# Patient Record
Sex: MALE | Race: WHITE
[De-identification: names, ages, dates, MRNs, and addresses within clinical notes are randomized per-mention and may not be internally consistent; named-entity substitution may affect disease eponyms.]

---

## 2018-04-17 ENCOUNTER — HOSPITAL ENCOUNTER (INPATIENT)
Dept: HOSPITAL 97 - ER | Age: 58
LOS: 6 days | Discharge: HOME | DRG: 392 | End: 2018-04-23
Attending: FAMILY MEDICINE | Admitting: FAMILY MEDICINE
Payer: SELF-PAY

## 2018-04-17 VITALS — BODY MASS INDEX: 25.1 KG/M2

## 2018-04-17 DIAGNOSIS — I10: ICD-10-CM

## 2018-04-17 DIAGNOSIS — E88.09: ICD-10-CM

## 2018-04-17 DIAGNOSIS — F10.20: ICD-10-CM

## 2018-04-17 DIAGNOSIS — E83.42: ICD-10-CM

## 2018-04-17 DIAGNOSIS — K52.9: Primary | ICD-10-CM

## 2018-04-17 DIAGNOSIS — R17: ICD-10-CM

## 2018-04-17 DIAGNOSIS — R74.0: ICD-10-CM

## 2018-04-17 DIAGNOSIS — E87.5: ICD-10-CM

## 2018-04-17 DIAGNOSIS — E87.1: ICD-10-CM

## 2018-04-17 DIAGNOSIS — Z72.0: ICD-10-CM

## 2018-04-17 LAB
ALBUMIN SERPL BCP-MCNC: 2.9 G/DL (ref 3.2–5.5)
ALP SERPL-CCNC: 349 IU/L (ref 42–121)
ALT SERPL W P-5'-P-CCNC: 115 IU/L (ref 10–60)
AST SERPL W P-5'-P-CCNC: 346 IU/L (ref 10–42)
BUN BLD-MCNC: 12 MG/DL (ref 6–20)
GLUCOSE SERPLBLD-MCNC: 98 MG/DL (ref 65–120)
HCT VFR BLD CALC: 43.6 % (ref 39.6–49)
INR BLD: 0.92
LIPASE SERPL-CCNC: 49 U/L (ref 22–51)
LYMPHOCYTES # SPEC AUTO: 1.8 K/UL (ref 0.7–4.9)
MCH RBC QN AUTO: 28.7 PG (ref 27–35)
MCV RBC: 88.1 FL (ref 80–100)
PMV BLD: 11.3 FL (ref 7.6–11.3)
POTASSIUM SERPL-SCNC: 4.5 MEQ/L (ref 3.6–5)
RBC # BLD: 4.95 M/UL (ref 4.33–5.43)

## 2018-04-17 PROCEDURE — 80048 BASIC METABOLIC PNL TOTAL CA: CPT

## 2018-04-17 PROCEDURE — 80076 HEPATIC FUNCTION PANEL: CPT

## 2018-04-17 PROCEDURE — 82274 ASSAY TEST FOR BLOOD FECAL: CPT

## 2018-04-17 PROCEDURE — 87046 STOOL CULTR AEROBIC BACT EA: CPT

## 2018-04-17 PROCEDURE — 80074 ACUTE HEPATITIS PANEL: CPT

## 2018-04-17 PROCEDURE — 83735 ASSAY OF MAGNESIUM: CPT

## 2018-04-17 PROCEDURE — 87177 OVA AND PARASITES SMEARS: CPT

## 2018-04-17 PROCEDURE — 84100 ASSAY OF PHOSPHORUS: CPT

## 2018-04-17 PROCEDURE — 93005 ELECTROCARDIOGRAM TRACING: CPT

## 2018-04-17 PROCEDURE — 80307 DRUG TEST PRSMV CHEM ANLYZR: CPT

## 2018-04-17 PROCEDURE — 96365 THER/PROPH/DIAG IV INF INIT: CPT

## 2018-04-17 PROCEDURE — 82705 FATS/LIPIDS FECES QUAL: CPT

## 2018-04-17 PROCEDURE — 83690 ASSAY OF LIPASE: CPT

## 2018-04-17 PROCEDURE — 84484 ASSAY OF TROPONIN QUANT: CPT

## 2018-04-17 PROCEDURE — 85025 COMPLETE CBC W/AUTO DIFF WBC: CPT

## 2018-04-17 PROCEDURE — 36415 COLL VENOUS BLD VENIPUNCTURE: CPT

## 2018-04-17 PROCEDURE — 85610 PROTHROMBIN TIME: CPT

## 2018-04-17 PROCEDURE — 96361 HYDRATE IV INFUSION ADD-ON: CPT

## 2018-04-17 PROCEDURE — 96368 THER/DIAG CONCURRENT INF: CPT

## 2018-04-17 PROCEDURE — 82962 GLUCOSE BLOOD TEST: CPT

## 2018-04-17 PROCEDURE — 74177 CT ABD & PELVIS W/CONTRAST: CPT

## 2018-04-17 PROCEDURE — 99285 EMERGENCY DEPT VISIT HI MDM: CPT

## 2018-04-17 PROCEDURE — 80329 ANALGESICS NON-OPIOID 1 OR 2: CPT

## 2018-04-17 PROCEDURE — 81003 URINALYSIS AUTO W/O SCOPE: CPT

## 2018-04-17 PROCEDURE — 83036 HEMOGLOBIN GLYCOSYLATED A1C: CPT

## 2018-04-17 PROCEDURE — 87493 C DIFF AMPLIFIED PROBE: CPT

## 2018-04-17 PROCEDURE — 80053 COMPREHEN METABOLIC PANEL: CPT

## 2018-04-17 PROCEDURE — 81015 MICROSCOPIC EXAM OF URINE: CPT

## 2018-04-17 PROCEDURE — 85730 THROMBOPLASTIN TIME PARTIAL: CPT

## 2018-04-17 PROCEDURE — 87209 SMEAR COMPLEX STAIN: CPT

## 2018-04-17 PROCEDURE — 96375 TX/PRO/DX INJ NEW DRUG ADDON: CPT

## 2018-04-17 PROCEDURE — 87045 FECES CULTURE AEROBIC BACT: CPT

## 2018-04-17 RX ADMIN — Medication SCH ML: at 21:14

## 2018-04-17 RX ADMIN — CIPROFLOXACIN SCH: 2 INJECTION, SOLUTION INTRAVENOUS at 21:00

## 2018-04-17 RX ADMIN — KETOROLAC TROMETHAMINE PRN MG: 30 INJECTION, SOLUTION INTRAMUSCULAR at 21:13

## 2018-04-17 RX ADMIN — METRONIDAZOLE SCH: 500 INJECTION, SOLUTION INTRAVENOUS at 18:00

## 2018-04-17 RX ADMIN — SODIUM CHLORIDE SCH MLS: 0.9 INJECTION, SOLUTION INTRAVENOUS at 21:13

## 2018-04-17 NOTE — ER
Nurse's Notes                                                                                     

 [facility]                                                                

Name: Nitin Ricci                                                                             

Age: 57 yrs                                                                                       

Sex: Male                                                                                         

: 1960                                                                                   

MRN: [id number]                                                                                   

Arrival Date: 2018                                                                          

Time: 09:48                                                                                       

Account#: [id number]                                                                            

Bed 20                                                                                            

Private MD:                                                                                       

Diagnosis: Colitis;Vomiting                                                                       

                                                                                                  

Presentation:                                                                                     

                                                                                             

10:24 Presenting complaint: Patient states: " My stomach has been hurting me bad for about a  ph  

      week and a half." Reports pain in L upper and lower quandrants, + N/V/D, denies fever.      

      Transition of care: patient was not received from another setting of care. Onset of         

      symptoms was 2018. Initial Sepsis Screen: Does the patient meet any 2             

      criteria? No. Patient's initial sepsis screen is negative. Does the patient have a          

      suspected source of infection? No. Patient initial sepsis screen negative. Care prior       

      to arrival: None.                                                                           

10:24 Method Of Arrival: Ambulatory                                                           ph  

10:24 Acuity: PREETI 3                                                                           ph  

                                                                                                  

Historical:                                                                                       

- Allergies:                                                                                      

10:26 No Known Allergies;                                                                     ph  

- Home Meds:                                                                                      

10:26 hydrochlorothiazide 25 mg Oral tab 1 tab once daily [Active];                           ph  

- PMHx:                                                                                           

10:26 Hypertension;                                                                           ph  

- PSHx:                                                                                           

10:26 Cholecystectomy; Appendectomy;                                                          ph  

                                                                                                  

- Immunization history:: Adult Immunizations unknown.                                             

- Social history:: Smoking status: Patient uses tobacco products, smokes one-half pack            

  cigarettes per day.                                                                             

- Family history:: not pertinent.                                                                 

- Hospitalizations: : No recent hospitalization is reported.                                      

                                                                                                  

                                                                                                  

Screening:                                                                                        

10:30 Abuse screen: Denies threats or abuse. Denies injuries from another. Nutritional        aj1 

      screening: No deficits noted. Tuberculosis screening: No symptoms or risk factors           

      identified.                                                                                 

17:10 Fall Risk No fall in past 12 months (0 pts). No secondary diagnosis (0 pts). IV access  aj1 

      (20 points). Ambulatory Aid- None/Bed Rest/Nurse Assist (0 pts). Gait- Normal/Bed           

      Rest/Wheelchair (0 pts) Mental Status- Oriented to own ability (0 pts). Total Dougherty         

      Fall Scale indicates No Risk (0-24 pts).                                                    

                                                                                                  

Assessment:                                                                                       

10:30 General: Appears in no apparent distress. uncomfortable, Behavior is calm, cooperative, aj1 

      appropriate for age. Pain: Complains of pain in right upper quadrant and right lower        

      quadrant Pain does not radiate. Pain currently is 10 out of 10 on a pain scale. Quality     

      of pain is described as sharp, Pain began one week ago. Neuro: Level of Consciousness       

      is awake, alert, obeys commands, Oriented to person, place, time, situation, Speech is      

      normal, Facial symmetry appears normal. Cardiovascular: Patient's skin is warm and dry.     

      Respiratory: Airway is patent Respiratory effort is even, unlabored, Respiratory            

      pattern is regular, symmetrical. GI: Abdomen is flat, non-distended, Bowel sounds           

      present X 4 quads. Abd is soft X 4 quads Abdomen is tender to palpation X 4 quads.          

      Reports lower abdominal pain, upper abdominal pain, diarrhea, nausea, vomiting. : No      

      signs and/or symptoms were reported regarding the genitourinary system. EENT: No signs      

      and/or symptoms were reported regarding the EENT system. Derm: No signs and/or symptoms     

      reported regarding the dermatologic system. Skin is pink, warm \T\ dry. normal.             

      Musculoskeletal: No signs and/or symptoms reported regarding the musculoskeletal            

      system. Circulation, motion, and sensation intact.                                          

11:18 Reassessment: Patient appears in no apparent distress at this time. No changes from     aj1 

      previously documented assessment. Patient and/or family updated on plan of care and         

      expected duration. Pain level reassessed. Patient is alert, oriented x 3, equal             

      unlabored respirations, skin warm/dry/pink.                                                 

12:28 Reassessment: Patient appears in no apparent distress at this time. No changes from     aj1 

      previously documented assessment. Patient and/or family updated on plan of care and         

      expected duration. Pain level reassessed. Patient is alert, oriented x 3, equal             

      unlabored respirations, skin warm/dry/pink.                                                 

13:17 Reassessment: Patient appears in no apparent distress at this time. No changes from     aj1 

      previously documented assessment. Patient and/or family updated on plan of care and         

      expected duration. Pain level reassessed. Patient is alert, oriented x 3, equal             

      unlabored respirations, skin warm/dry/pink.                                                 

14:19 Reassessment: Patient and/or family updated on plan of care and expected duration. Pain aj1 

      level reassessed. General: Appears in no apparent distress. comfortable, Behavior is        

      calm, cooperative. Neuro: Level of Consciousness is awake, alert, obeys commands,           

      Oriented to person, place, time, situation, Speech is normal, Facial symmetry appears       

      normal. Cardiovascular: Patient's skin is warm and dry. Respiratory: Airway is patent       

      Respiratory effort is even, unlabored, Respiratory pattern is regular, symmetrical. GI:     

      Abdomen is flat, non-distended, Abd is soft X 4 quads. Derm: Skin is pink, warm \T\ dry.    

      normal. Musculoskeletal: Circulation, motion, and sensation intact.                         

15:22 Reassessment: Patient appears in no apparent distress at this time. No changes from     aj1 

      previously documented assessment. Patient and/or family updated on plan of care and         

      expected duration. Pain level reassessed. Patient is alert, oriented x 3, equal             

      unlabored respirations, skin warm/dry/pink.                                                 

16:30 Reassessment: Patient appears in no apparent distress at this time. No changes from     aj1 

      previously documented assessment. Patient and/or family updated on plan of care and         

      expected duration. Pain level reassessed. Patient is alert, oriented x 3, equal             

      unlabored respirations, skin warm/dry/pink.                                                 

17:09 Reassessment: Dr. Bob at bedside.                                                     aj1 

17:30 Reassessment: Patient appears in no apparent distress at this time. No changes from     aj1 

      previously documented assessment. Patient and/or family updated on plan of care and         

      expected duration. Pain level reassessed. Patient is alert, oriented x 3, equal             

      unlabored respirations, skin warm/dry/pink.                                                 

18:30 Reassessment: Patient and/or family updated on plan of care and expected duration. Pain aj1 

      level reassessed. General: Appears in no apparent distress. comfortable, Behavior is        

      calm, cooperative. Neuro: Level of Consciousness is awake, alert, obeys commands,           

      Oriented to person, place, time, situation, Speech is normal, Facial symmetry appears       

      normal. Cardiovascular: Patient's skin is warm and dry. Respiratory: Airway is patent       

      Respiratory effort is even, unlabored, Respiratory pattern is regular, symmetrical. GI:     

      Abdomen is flat, non-distended, Bowel sounds present X 4 quads. Abd is soft X 4 quads       

      Abdomen is tender to palpation X 4 quads. Reports lower abdominal pain, upper abdominal     

      pain. : No signs and/or symptoms were reported regarding the genitourinary system.        

      EENT: No signs and/or symptoms were reported regarding the EENT system. Derm: No signs      

      and/or symptoms reported regarding the dermatologic system. Skin is pink, warm \T\ dry.     

      normal. Musculoskeletal: No signs and/or symptoms reported regarding the                    

      musculoskeletal system. Circulation, motion, and sensation intact.                          

19:30 Reassessment: Patient appears in no apparent distress at this time. No changes from     aj1 

      previously documented assessment. Patient and/or family updated on plan of care and         

      expected duration. Pain level reassessed. Patient is alert, oriented x 3, equal             

      unlabored respirations, skin warm/dry/pink.                                                 

20:12 Reassessment: Patient appears in no apparent distress at this time. No changes from     aj1 

      previously documented assessment. Patient and/or family updated on plan of care and         

      expected duration. Pain level reassessed. Patient is alert, oriented x 3, equal             

      unlabored respirations, skin warm/dry/pink.                                                 

                                                                                                  

Vital Signs:                                                                                      

10:26  / 115; Pulse 122; Resp 24; Temp 97.3; Pulse Ox 96% on R/A; Weight 90.72 kg;      ph  

      Height 6 ft. 3 in. (190.50 cm); Pain 10/10;                                                 

11:18  / 87; Pulse 98; Resp 18; Pulse Ox 95% on R/A;                                    aj1 

13:17  / 87; Pulse 93; Resp 18; Pulse Ox 97% ;                                          aj1 

14:20  / 88; Pulse 93; Resp 18; Pulse Ox 98% ;                                          aj1 

15:22  / 90; Pulse 102; Resp 18; Pulse Ox 95% on R/A;                                   aj1 

17:10  / 87; Pulse 105; Resp 20; Pulse Ox 98% on R/A;                                   aj1 

20:12  / 89; Pulse 97; Resp 18; Pulse Ox 96% on R/A;                                    aj1 

10:26 Body Mass Index 25.00 (90.72 kg, 190.50 cm)                                             ph  

                                                                                                  

ED Course:                                                                                        

09:48 Patient arrived in ED.                                                                  as  

10:26 Triage completed.                                                                       ph  

10:27 Arm band placed on.                                                                     ph  

10:29 Rudolph Mazariegos MD is Attending Physician.                                                rn  

10:29 Kalpana Harrison, RN is Primary Nurse.                                                   aj1 

10:30 Patient has correct armband on for positive identification. Bed in low position. Call   aj1 

      light in reach. Side rails up X 1.                                                          

10:30 No provider procedures requiring assistance completed.                                  aj1 

10:41 Inserted saline lock: 22 gauge in right forearm, using aseptic technique. ,using        aj1 

      aseptic technique. IV inserted by ED Tech Joshua. Blood collected.                           

11:23 EKG done, by EKG tech. reviewed by Rudolph Mazariegos MD.                                        

13:57 Radiology exam delayed due to lab results not completed at this time. (BUN/Creatinine).   

14:42 CT Abd/Pelvis - W/Contrast In Process Unspecified.                                      EDMS

14:43 CT completed. Patient tolerated procedure well. Patient moved back from CT.             sj  

15:29 Ranulfo Bob DO is Hospitalizing Provider.                                            rn  

15:29 Urmila Bob MD is Hospitalizing Provider.                                           rn  

20:11 Report given to DANIEL Tucker.                                                          aj1 

20:11 Patient admitted, IV remains in place.                                                  aj1 

                                                                                                  

Administered Medications:                                                                         

11:03 Drug: morphine 4 mg Route: IVP; Site: right forearm;                                    aj1 

11:50 Follow up: Response: No adverse reaction                                                aj1 

11:04 Drug: Zofran 4 mg Route: IVP; Site: right forearm;                                      aj1 

11:51 Follow up: Response: No adverse reaction                                                aj1 

11:04 Drug: NS 0.9% 1000 ml Route: IV; Rate: 1000 ml; Site: right forearm;                    aj1 

17:12 Follow up: IV Status: Completed infusion; IV Intake: 1000ml                             aj1 

15:51 Drug: Cipro 400 mg Volume: 200 ml; Route: IVPB; Infused Over: 60 mins; Site: right      aj1 

      forearm;                                                                                    

17:12 Follow up: IV Status: Completed infusion; IV Intake: 200ml                              aj1 

20:13 Follow up: IV Status: Completed infusion; IV Intake: 200ml                              aj1 

15:51 Drug: Flagyl 500 mg Volume: 100 ml; Route: IVPB; Rate: 200 ml/hr; Infused Over: 30      aj1 

      mins; Site: right forearm;                                                                  

17:13 Follow up: IV Status: Completed infusion; IV Intake: 100ml                              aj1 

20:13 Follow up: IV Status: Completed infusion; IV Intake: 100ml                              aj1 

                                                                                                  

                                                                                                  

Intake:                                                                                           

17:12 IV: 1000ml; Total: 1000ml.                                                              aj1 

17:12 IV: 200ml; Total: 1200ml.                                                               aj1 

17:13 IV: 100ml; Total: 1300ml.                                                               aj1 

20:13 IV: 200ml; Total: 1500ml.                                                               aj1 

20:13 IV: 100ml; Total: 1600ml.                                                               aj1 

                                                                                                  

Outcome:                                                                                          

15:31 Decision to Hospitalize by Provider.                                                    rn  

20:37 Admitted to Med/surg accompanied by tech, via wheelchair, room 406.                     aj1 

20:37 Condition: stable                                                                           

20:37 Discharge instructions given to patient, Instructed on the need for admit, Demonstrated     

      understanding of instructions.                                                              

20:37 Patient left the ED.                                                                    aj1 

                                                                                                  

Signatures:                                                                                       

Dispatcher [employer]                           EDMS                                                 

Kalpana Harrison RN                     RN   aj1                                                  

Anel Echeverria Amelia as Nieto, Roman, MD MD rn Harrell, Venessa vh Hall, Patricia, RN                      RN   ph                                                   

                                                                                                  

Corrections: (The following items were deleted from the chart)                                    

:10 11: General: Appears in no apparent distress. uncomfortable, Behavior is calm,        aj1 

      cooperative, appropriate for age, aj1                                                       

11:10 11: Pain: Complains of pain in right upper quadrant and right lower quadrant Pain     aj1 

      does not radiate. Pain currently is 10 out of 10 on a pain scale. Quality of pain is        

      described as sharp, Pain began one week ago aj1                                             

11:10 11: Neuro: Level of Consciousness is awake, alert, obeys commands, Oriented to        aj1 

      person, place, time, situation, Speech is normal, Facial symmetry appears normal, aj1       

11:10 11: Cardiovascular: Patient's skin is warm and dry. aj1                               aj1 

11:10 11: Respiratory: Airway is patent Respiratory effort is even, unlabored, Respiratory  aj1 

      pattern is regular, symmetrical, aj1                                                        

11:10 11: GI: Abdomen is flat, non-distended, Bowel sounds present X 4 quads. Abd is soft X aj1 

      4 quads Abdomen is tender to palpation X 4 quads. Reports lower abdominal pain, upper       

      abdominal pain, diarrhea, nausea, vomiting, aj1                                             

11:10 11: : No signs and/or symptoms were reported regarding the genitourinary system. aj1aj1 

:10 11:06 EENT: No signs and/or symptoms were reported regarding the EENT system. aj1       aj1 

11:10 11: Derm: No signs and/or symptoms reported regarding the dermatologic system. Skin   aj1 

      is pink, warm \T\ dry. normal, aj1                                                          

11:10 11: Musculoskeletal: No signs and/or symptoms reported regarding the musculoskeletal  aj1 

      system. Circulation, motion, and sensation intact. aj1                                      

                                                                                                  

**************************************************************************************************

## 2018-04-17 NOTE — EKG
Test Date:    2018-04-17               Test Time:    11:12:10

Technician:   NURIS                                    

                                                     

MEASUREMENT RESULTS:                                       

Intervals:                                           

Rate:         100                                    

VA:           140                                    

QRSD:         88                                     

QT:           334                                    

QTc:          430                                    

Axis:                                                

P:            59                                     

VA:           140                                    

QRS:          75                                     

T:            61                                     

                                                     

INTERPRETIVE STATEMENTS:                                       

                                                     

Normal sinus rhythm

Normal ECG

Compared to ECG 01/19/2018 12:24:34

Sinus tachycardia no longer present



Electronically Signed On 04-17-18 16:27:49 CDT by Too Huber

## 2018-04-17 NOTE — P.HP
Certification for Inpatient


Patient admitted to: Inpatient


With expected LOS: >2 Midnights


Patient will require the following post-hospital care: None


Practitioner: I am a practitioner with admitting privileges, knowledge of 

patient current condition, hospital course, and medical plan of care.


Services: Services provided to patient in accordance with Admission 

requirements found in Title 42 Section 412.3 of the Code of Federal Regulations





Patient History


Date of Service: 04/17/18


Primary Care Provider: None


Reason for admission: Abd pain


History of Present Illness: 





This is a 57-year-old male with significant past medical history of 

hypertension who presented to the ED complaining of having some diffuse 

abdominal pain nausea vomiting and watery diarrhea.  Patient stated that his 

symptoms have been going on for about 1 week and a half and has been getting 

progressively worse.  He has not been able to keep anything down other than 

soup at night time which he eventually throws up in the morning.  Patient 

states that in the morning he starts having some dry heaving and then noticed 

that he cough off all blood.  Patient says that he has not had similar episodes 

in the past and is the 1st time that he noticed the abdominal pain.  Patient is 

not followup with a primary care provider or does not have any GI doctor.  

Patient is states that he takes about 1-2 alcoholic beverages at night time 

mostly beer.


Allergies





No Known Drug Allergies Allergy (Mild, Unverified 06/22/16 03:34)


 Unknown


No Known Allergie Allergy (Uncoded 06/18/16 22:54)


 Unknown





Home Medications: 








NK [No Home Meds]  04/17/15 








- Past Medical/Surgical History


Diabetic: No


-: HTN


-: ETOH ABUSE


-: paralytic ileus


-: cholecystectomy


-: appendectomy





- Social History


Alcohol use: Yes


CD- Drugs: No


Caffeine use: Yes





Review of Systems


General: As per HPI





Physical Examination





- Physical Exam


General: Alert, In no apparent distress, Oriented x3


HEENT: Atraumatic, Scleral icterus


Neck: Supple


Respiratory: Clear to auscultation bilaterally, Normal air movement


Cardiovascular: Regular rate/rhythm, Normal S1 S2


Gastrointestinal: Normal bowel sounds, Tenderness (Generalized lower abdomen)


Musculoskeletal: No tenderness


Integumentary: No rashes


Neurological: Normal speech, Normal tone


Lymphatics: No axilla or inguinal lymphadenopathy





- Studies


Laboratory Data (last 24 hrs)





04/17/18 12:40: Creatinine 0.72


04/17/18 12:40: Sodium 132 L, Potassium 4.5, BUN 12, Creatinine 0.72, Glucose 98

, Total Bilirubin 4.2 H,  H*,  H, Alkaline Phosphatase 349 H, 

Lipase 49


04/17/18 10:31: WBC 6.0, Hgb 14.2, Hct 43.6, Plt Count 165








Assessment and Plan





- Problems (Diagnosis)


(1) Colitis


Current Visit: Yes   Status: Acute   


Plan: 


Abd pain with N/V 


-Cipro and flagyl 


-GI consulted. 


-NPO with IV fluids 











(2) Elevated transaminase level


Current Visit: Yes   Status: Acute   


Plan: 


H/O of questionable Hepatitis 


-Acute panel ordered 


-IV fluids and GI consult. 











(3) HTN (hypertension)


Current Visit: Yes   Status: Chronic   


Qualifiers: 


   Hypertension type: essential hypertension   Qualified Code(s): I10 - 

Essential (primary) hypertension   


Discharge Plan: Home


Plan to discharge in: 24 Hours





- Advance Directives


Does patient have a Living Will: No


Does patient have a Durable POA for Healthcare: No





- Code Status/Comfort Care


Code Status Assessed: Yes


Critical Care: No

## 2018-04-17 NOTE — EDPHYS
Physician Documentation                                                                           

 [facility]                                                                

Name: Nitin Ricci                                                                             

Age: 57 yrs                                                                                       

Sex: Male                                                                                         

: 1960                                                                                   

MRN: [id number]                                                                                   

Arrival Date: 2018                                                                          

Time: 09:48                                                                                       

Account#: [id number]                                                                            

Bed 20                                                                                            

Private MD:                                                                                       

ED Physician Rudolph Mazariegos                                                                         

HPI:                                                                                              

                                                                                             

10:51 This 57 yrs old  Male presents to ER via Ambulatory with complaints of         rn  

      Abdominal Pain.                                                                             

10:51 The patient presents with abdominal pain that is diffuse. Onset: The symptoms/episode   rn  

      began/occurred 1 week(s) ago. The symptoms do not radiate. Associated signs and             

      symptoms: Pertinent positives: nausea and vomiting, diarrhea, Pertinent negatives:          

      fever, shortness of breath, testicular pain. Modifying factors: The symptoms are            

      alleviated by nothing, the symptoms are aggravated by movement, touching the area.          

      Severity of pain: At its worst the pain was moderate in the emergency department the        

      pain is unchanged. The patient has not experienced similar symptoms in the past.            

      Reports diffuse abd pain, nausea/vomiting/diarrhea, states small amount of blood in         

      emesis the most recent episodes, drinks frequently but not today. Reports                   

      cholecystectomy and appendectomy. .                                                         

                                                                                                  

Historical:                                                                                       

- Allergies:                                                                                      

10:26 No Known Allergies;                                                                     ph  

- Home Meds:                                                                                      

10:26 hydrochlorothiazide 25 mg Oral tab 1 tab once daily [Active];                           ph  

- PMHx:                                                                                           

10:26 Hypertension;                                                                           ph  

- PSHx:                                                                                           

10:26 Cholecystectomy; Appendectomy;                                                          ph  

                                                                                                  

- Immunization history:: Adult Immunizations unknown.                                             

- Social history:: Smoking status: Patient uses tobacco products, smokes one-half pack            

  cigarettes per day.                                                                             

- Family history:: not pertinent.                                                                 

- Hospitalizations: : No recent hospitalization is reported.                                      

                                                                                                  

                                                                                                  

ROS:                                                                                              

10:51 Constitutional: Negative for fever, chills, and weight loss, Eyes: Negative for injury, rn  

      pain, redness, and discharge, Neck: Negative for injury, pain, and swelling,                

      Cardiovascular: Negative for chest pain, palpitations, and edema, Respiratory: Negative     

      for shortness of breath, cough, wheezing, and pleuritic chest pain, Abdomen/GI: + abd       

      pain/nausea/vomiting/diarrhea Back: Negative for injury and pain, MS/Extremity:             

      Negative for injury and deformity, Skin: Negative for injury, rash, and discoloration,      

      Neuro: Negative for headache, weakness, numbness, tingling, and seizure.                    

                                                                                                  

Exam:                                                                                             

10:51 Constitutional:  This is a well developed, well nourished patient who is awake, alert,  rn  

      and in no acute distress. Head/Face:  Normocephalic, atraumatic. Eyes:  Pupils equal        

      round and reactive to light, extra-ocular motions intact.  Lids and lashes normal.          

      Conjunctiva and sclera are non-icteric and not injected.  Cornea within normal limits.      

      Periorbital areas with no swelling, redness, or edema. Neck:  Trachea midline, no           

      thyromegaly or masses palpated, and no cervical lymphadenopathy.  Supple, full range of     

      motion without nuchal rigidity, or vertebral point tenderness.  No Meningismus.             

      Cardiovascular:  tachycardic, regular, no murmur Respiratory:  + mild tachypnea, no         

      retractions, faint exp wheezing Abdomen/GI:  soft, + tender in all quadrants MS/            

      Extremity:  Pulses equal, no cyanosis.  Neurovascular intact.  Full, normal range of        

      motion.  Equal circumference. Neuro:  Awake and alert, GCS 15, oriented to person,          

      place, time, and situation.  Cranial nerves II-XII grossly intact.  Motor strength 5/5      

      in all extremities.  Sensory grossly intact.  Cerebellar exam normal.  Normal gait.         

11:32 ECG was reviewed by the Attending Physician.                                            rn  

                                                                                                  

Vital Signs:                                                                                      

10:26  / 115; Pulse 122; Resp 24; Temp 97.3; Pulse Ox 96% on R/A; Weight 90.72 kg;      ph  

      Height 6 ft. 3 in. (190.50 cm); Pain 10/10;                                                 

11:18  / 87; Pulse 98; Resp 18; Pulse Ox 95% on R/A;                                    aj1 

13:17  / 87; Pulse 93; Resp 18; Pulse Ox 97% ;                                          aj1 

14:20  / 88; Pulse 93; Resp 18; Pulse Ox 98% ;                                          aj1 

15:22  / 90; Pulse 102; Resp 18; Pulse Ox 95% on R/A;                                   aj1 

17:10  / 87; Pulse 105; Resp 20; Pulse Ox 98% on R/A;                                   aj1 

20:12  / 89; Pulse 97; Resp 18; Pulse Ox 96% on R/A;                                    aj1 

10:26 Body Mass Index 25.00 (90.72 kg, 190.50 cm)                                             ph  

                                                                                                  

MDM:                                                                                              

10:29 Patient medically screened.                                                             rn  

15:28 Differential diagnosis: bowel obstruction, diverticulitis, gastritis, gastroesophageal  rn  

      reflux disease, non-specific abd pain, Ureterolithiasis. Data reviewed: vital signs,        

      nurses notes, lab test result(s), radiologic studies, CT scan, and as a result, I will      

      admit patient. Counseling: I had a detailed discussion with the patient and/or guardian     

      regarding: the historical points, exam findings, and any diagnostic results supporting      

      the discharge/admit diagnosis, lab results, radiology results, the need for further         

      work-up and treatment in the hospital. Response to treatment: the patient's symptoms        

      have mildly improved after treatment, and as a result, I will admit patient. Admission      

      orders: after a detailed discussion of the patient's condition and case, the admit          

      orders are written by me. ED course: Pt still having vomiting episodes, tender, scared      

      to go home since lives by himself, given abx, will admit. .                                 

                                                                                                  

                                                                                             

10:35 Order name: Basic Metabolic Panel; Complete Time: 14:52                                 rn  

                                                                                             

10:35 Order name: CBC with Diff; Complete Time: 12:34                                         rn  

                                                                                             

10:35 Order name: Creatinine for Radiology; Complete Time: 14:52                              rn  

                                                                                             

10:35 Order name: Hepatic Function; Complete Time: 14:52                                      rn  

                                                                                             

10:35 Order name: Lipase; Complete Time: 14:52                                                rn  

                                                                                             

10:35 Order name: Troponin (emerg Dept Use Only); Complete Time: 14:52                        rn  

                                                                                             

10:35 Order name: CT Abd/Pelvis - W/Contrast; Complete Time: 15:04                            rn  

                                                                                             

18:30 Order name: Protime (+INR)                                                              EDMS

                                                                                             

18:30 Order name: PTT, Activated Partial Thromb                                               EDMS

                                                                                             

10:35 Order name: IV Saline Lock; Complete Time: 10:54                                        rn  

                                                                                             

10:35 Order name: Labs collected and sent; Complete Time: 10:54                               rn  

                                                                                             

10:35 Order name: EKG; Complete Time: 10:36                                                   rn  

                                                                                             

10:35 Order name: EKG - Nurse/Tech; Complete Time: 11:50                                      rn  

                                                                                             

11:13 Order name: Labs - recollect needed; Complete Time: 11:50                               bd  

                                                                                                  

EC:32 Rate is 100 beats/min. Rhythm is regular. QRS Axis is Normal. CT interval is normal.    rn  

      QRS interval is normal. QT interval is normal. No Q waves. T waves are Normal. No ST        

      changes noted. Clinical impression: Normal ECG. Interpreted by me.                          

                                                                                                  

Administered Medications:                                                                         

11:03 Drug: morphine 4 mg Route: IVP; Site: right forearm;                                    aj 

11:50 Follow up: Response: No adverse reaction                                                [facility] 

11:04 Drug: Zofran 4 mg Route: IVP; Site: right forearm;                                      aj1 

11:51 Follow up: Response: No adverse reaction                                                [facility] 

11:04 Drug: NS 0.9% 1000 ml Route: IV; Rate: 1000 ml; Site: right forearm;                    aj 

17:12 Follow up: IV Status: Completed infusion; IV Intake: 1000ml                             [facility] 

15:51 Drug: Cipro 400 mg Volume: 200 ml; Route: IVPB; Infused Over: 60 mins; Site: right      aj1 

      forearm;                                                                                    

17:12 Follow up: IV Status: Completed infusion; IV Intake: 200ml                              [facility] 

20:13 Follow up: IV Status: Completed infusion; IV Intake: 200ml                              [facility] 

15:51 Drug: Flagyl 500 mg Volume: 100 ml; Route: IVPB; Rate: 200 ml/hr; Infused Over: 30      aj1 

      mins; Site: right forearm;                                                                  

17:13 Follow up: IV Status: Completed infusion; IV Intake: 100ml                              [facility] 

20:13 Follow up: IV Status: Completed infusion; IV Intake: 100ml                              [facility] 

                                                                                                  

                                                                                                  

Disposition:                                                                                      

18 15:31 Hospitalization ordered by Urmila Bob for Inpatient Admission. Preliminary     

  diagnosis are Colitis, Vomiting.                                                                

- Bed requested for Telemetry/MedSurg (Inpatient).                                                

- Status is Inpatient Admission.                                                              aj1 

- Condition is Stable.                                                                            

- Problem is new.                                                                                 

- Symptoms have improved.                                                                         

UTI on Admission? No                                                                              

                                                                                                  

                                                                                                  

                                                                                                  

Signatures:                                                                                       

Dispatcher [employer]                           EDStephanie Johnson Angela, RN                     RN   aj1                                                  

Rudolph Mazariegos MD MD rn Hall, Patricia, RN                      RN                                                      

                                                                                                  

**************************************************************************************************

## 2018-04-17 NOTE — RAD REPORT
EXAM DESCRIPTION:  CTAbdomen   Pelvis W Contrast - 4/17/2018 2:41 pm

 

CLINICAL HISTORY:  Abdominal pain.

Nausea, vomiting and diarrhea.

 

COMPARISON:  01/19/2018, 04/17/2015

 

TECHNIQUE:  Biphasic CT imaging of the abdomen and pelvis was performed with 100 ml non-ionic IV cont
rast.

 

All CT scans are performed using dose optimization technique as appropriate and may include automated
 exposure control or mA/KV adjustment according to patient size.

 

FINDINGS:  The lung bases are clear.

 

Prominent diffuse fatty liver noted. Cholecystectomy clips are seen. The spleen, pancreas, adrenal gl
ands and kidneys are within normal limits.

 

No bowel obstruction, free air, free fluid or abscess. Mild mucosal thickening of the colon is seen w
hich could indicate a mild nonspecific colitis. The appendix appears absent. Scattered colonic divert
icula. No evidence of significant lymphadenopathy.

 

No suspicious bony findings.

 

IMPRESSION:  Mild nonspecific colitis is suspected.

 

Fatty liver.

## 2018-04-18 LAB
ALBUMIN SERPL BCP-MCNC: 2.5 G/DL (ref 3.2–5.5)
ALP SERPL-CCNC: 300 IU/L (ref 42–121)
ALT SERPL W P-5'-P-CCNC: 105 IU/L (ref 10–60)
AST SERPL W P-5'-P-CCNC: 246 IU/L (ref 10–42)
BUN BLD-MCNC: 10 MG/DL (ref 6–20)
GLUCOSE SERPLBLD-MCNC: 105 MG/DL (ref 65–120)
HCT VFR BLD CALC: 35 % (ref 39.6–49)
INR BLD: 0.88
MAGNESIUM SERPL-MCNC: 1.7 MG/DL (ref 1.8–2.5)
MCH RBC QN AUTO: 29 PG (ref 27–35)
MCV RBC: 86.8 FL (ref 80–100)
METHAMPHET UR QL SCN: NEGATIVE
MORPHOLOGY BLD-IMP: (no result)
PMV BLD: 9.3 FL (ref 7.6–11.3)
POTASSIUM SERPL-SCNC: 3.8 MEQ/L (ref 3.6–5)
RBC # BLD: 4.03 M/UL (ref 4.33–5.43)
THC SERPL-MCNC: (no result) NG/ML
UA COMPLETE W REFLEX CULTURE PNL UR: (no result)
UA DIPSTICK W REFLEX MICRO PNL UR: (no result)

## 2018-04-18 RX ADMIN — METRONIDAZOLE SCH MLS: 500 INJECTION, SOLUTION INTRAVENOUS at 18:00

## 2018-04-18 RX ADMIN — METRONIDAZOLE SCH MLS: 500 INJECTION, SOLUTION INTRAVENOUS at 12:47

## 2018-04-18 RX ADMIN — SODIUM CHLORIDE SCH: 0.9 INJECTION, SOLUTION INTRAVENOUS at 04:00

## 2018-04-18 RX ADMIN — Medication SCH: at 20:39

## 2018-04-18 RX ADMIN — METRONIDAZOLE SCH MLS: 500 INJECTION, SOLUTION INTRAVENOUS at 23:07

## 2018-04-18 RX ADMIN — METRONIDAZOLE SCH MLS: 500 INJECTION, SOLUTION INTRAVENOUS at 06:14

## 2018-04-18 RX ADMIN — SODIUM CHLORIDE SCH MLS: 0.9 INJECTION, SOLUTION INTRAVENOUS at 23:07

## 2018-04-18 RX ADMIN — SODIUM CHLORIDE SCH MLS: 0.9 INJECTION, SOLUTION INTRAVENOUS at 06:14

## 2018-04-18 RX ADMIN — KETOROLAC TROMETHAMINE PRN MG: 30 INJECTION, SOLUTION INTRAMUSCULAR at 07:56

## 2018-04-18 RX ADMIN — CIPROFLOXACIN SCH MLS: 2 INJECTION, SOLUTION INTRAVENOUS at 20:39

## 2018-04-18 RX ADMIN — METRONIDAZOLE SCH MLS: 500 INJECTION, SOLUTION INTRAVENOUS at 00:52

## 2018-04-18 RX ADMIN — CIPROFLOXACIN SCH MLS: 2 INJECTION, SOLUTION INTRAVENOUS at 09:08

## 2018-04-18 RX ADMIN — Medication SCH: at 09:00

## 2018-04-18 NOTE — CON
Date of Consultation:  04/18/2018



Reason For Consultation:  Abnormal CT scan, abdominal pain, nausea, vomiting, and diarrhea.



History Of Present Illness:  Mr. Ricci is a 57-year-old gentleman, who presented to the hospital
 with increasing abdominal pain, nausea, vomiting, and diarrhea.  The symptom complex are 2 days in d
uration.  He denies of any travel, any exotic food, any antibiotic or any other predisposing factor. 
 Abdominal pain became 10/10.  This is crampy abdominal pain with exacerbations with bowel movement. 
 He also had severe burning sensation.  Denies any hematemesis, melena, or hematochezia.  No fever or
 chills.  After coming to the hospital, he is somewhat better.  Does not have any fever chills at thi
s time.



Past Medical History:  Hypertension, alcohol use.



Past Surgical History:  Cholecystectomy, appendectomy.



Family History:  Denies any gastrointestinal malignancy in the family.



Social History:  He drinks alcohol to a significant degree.  Positive tobacco.



Psychiatric History:  None.



Allergies:  REVIEWED IN THE CHART.



Medications:  Reviewed in the chart.



Review of Systems:

General:  No weight loss, weight gain.  No fever or chills that he knows of. 

GI:  As elaborated above. 

Hepatologic:  Denies any history of jaundice, hepatitis, any other liver inflection in the past.  He 
did not know that his liver enzymes are elevated any time in the past. 

Pulmonary:  No shortness of breath, cough, or expectoration. 

Cardiac:  No palpitation, heart murmur.  No orthopnea or dyspnea. 

Genitourinary:  No complaint. 

Musculoskeletal:  no arthralgia, no myalgias, only some morning stiffness that has been chronic for a
 while. 

Dermatologic:  No pruritus.



Physical Examination:

General:  Young male, no acute distress noted; however, appears to be icteric although he denies.  He
modynamic and respiratory profile within normal range.  

HEENT:  Atraumatic, normocephalic.  No pallor, but icterus noted.  No temporal wasting noted.  Oropha
ryngeal areas have icterus. 

Neck:  Supple.  No lymphadenopathy.  Trachea central in position. 

Chest:  Clear to auscultation and percussion. 

Cardiovascular:  Normal S1, S2.  No S3, no S4. 

Abdomen:  Soft; however, diffuse tenderness on deep palpation.  No hepatomegaly.  No splenomegaly.  N
o ascites.  No succussion splash.  No rebound tenderness. 

Extremities:  Upper and lower extremities are normal, symmetrical. 

Dermatologic:  Normal other than icterus. 

Neurologic:  He is alert and oriented x3.  Intact memory, mentation, and judgment.  He can move all p
arts of extremities without any other problem.  No asterixis.  No flapping tremor.



Diagnostic Data:  Reviewed and analyzed of importance.  AST, ALT is 346 and 115 respectively, alkalin
e phosphatase 342, total bilirubin is 4.2, and albumin is 2.9.  CT scan, just nonspecific colitis and
 fatty liver.



Impression, Plan, And Recommendation:  Mr. Ricci is a 57-year-old gentleman with at this time ac
ryan problem of colitis, diarrhea, nausea, vomiting, abdominal pain, also he has elevated LFT includin
g jaundice.  With hypoalbuminemia, it will be probably consistent with cirrhosis. 



He will need significant workup.  We will do all cirrhosis workup in addition to that, continue on an
tibiotic because being cirrhotic, he is at a very high risk for Salmonella infection.  Preferably ant
ibiotic should cover salmonella.  His nausea, vomiting is somewhat better.  I will start him on diet.
  He is advised to follow up in the office.  Once his acute condition resolves we will do a colonosco
py to rule out any colorectal neoplasia. 



I have had a long discussion with the patient regarding his management.  Discussion of complications,
 discussion of colonoscopy.  Indications, contraindications, possible complications discussed with th
e patient including but not limited to the possibility of bleeding, perforation, tear, infection, sep
sis, need for surgery, need for blood transfusion, anesthesia related problems including rare fatalit
ies.  I will also order new blood workup.





BAILEY/PRASHANT

DD:  04/18/2018 13:19:26Voice ID:  [id number]

DT:  04/18/2018 17:54:59Report ID:  [id number]

## 2018-04-18 NOTE — P.PN
Subjective


Date of Service: 04/18/18


Primary Care Provider: None


Chief Complaint: Abd pain


Pt seen and examined at bedside with RN. Chart reveiwed. Case D/w with GI. 

Awaiting reccs and lab at this time.  And still complaining of having some 

nausea and vomiting this morning.  Currently is NPO.  States that his abdominal 

pain is still there however has improved much from yesterday.





Review of Systems


10-point ROS is otherwise unremarkable





Physical Examination





- Vital Signs


Temperature: 98.6 F


Blood Pressure: 170/92


Pulse: 90


Respirations: 20


Pulse Ox (%): 94





- Physical Exam


General: Alert, In no apparent distress, Oriented x3


HEENT: Atraumatic


Neck: Supple


Respiratory: Clear to auscultation bilaterally, Normal air movement


Cardiovascular: Regular rate/rhythm, Normal S1 S2


Gastrointestinal: Normal bowel sounds, Soft and benign, Tenderness


Musculoskeletal: No tenderness


Integumentary: No rashes


Neurological: Normal speech, Normal tone, Normal affect


Lymphatics: No axilla or inguinal lymphadenopathy





- Studies


Laboratory Data (last 24 hrs)





04/17/18 12:40: Creatinine 0.72


04/17/18 12:40: Sodium 132 L, Potassium 4.5, BUN 12, Creatinine 0.72, Glucose 98

, Total Bilirubin 4.2 H,  H*,  H, Alkaline Phosphatase 349 H, 

Lipase 49





Medications List Reviewed: Yes





Assessment & Plan





- Problems (Diagnosis)


(1) Colitis


Onset Date: 04/18/18   Current Visit: Yes   Status: Acute   


Plan: 


Abd pain with N/V. Still with N/V today. Improvement in Abd pain. 


-Cipro and flagyl 


-GI consulted. 


-NPO with IV fluids 











(2) Elevated transaminase level


Onset Date: 04/18/18   Current Visit: Yes   Status: Acute   


Plan: 


H/O of questionable Hepatitis. Improvement in AST and ALT today. Elevated 

Tbili. 


-Acute hepatitis panel pending. Past Hepatitis B ? positive. 


-IV fluids and GI consult. 











(3) HTN (hypertension)


Onset Date: 04/18/18   Current Visit: Yes   Status: Chronic   


Qualifiers: 


   Hypertension type: essential hypertension   Qualified Code(s): I10 - 

Essential (primary) hypertension   


Discharge Plan: Home


Plan to discharge in: 24 Hours





- Code Status/Comfort Care


Code Status Assessed: Yes


Critical Care: No

## 2018-04-19 LAB
ALBUMIN SERPL BCP-MCNC: 2.6 G/DL (ref 3.2–5.5)
ALP SERPL-CCNC: 323 IU/L (ref 42–121)
ALT SERPL W P-5'-P-CCNC: 113 IU/L (ref 10–60)
AST SERPL W P-5'-P-CCNC: 269 IU/L (ref 10–42)
BUN BLD-MCNC: < 5 MG/DL (ref 6–20)
GLUCOSE SERPLBLD-MCNC: 114 MG/DL (ref 65–120)
HCT VFR BLD CALC: 34.7 % (ref 39.6–49)
LYMPHOCYTES # SPEC AUTO: 1.3 K/UL (ref 0.7–4.9)
MAGNESIUM SERPL-MCNC: 1.8 MG/DL (ref 1.8–2.5)
MCH RBC QN AUTO: 28 PG (ref 27–35)
MCV RBC: 85.6 FL (ref 80–100)
PMV BLD: 9.7 FL (ref 7.6–11.3)
POTASSIUM SERPL-SCNC: 3.7 MEQ/L (ref 3.6–5)
RBC # BLD: 4.05 M/UL (ref 4.33–5.43)

## 2018-04-19 RX ADMIN — SODIUM CHLORIDE SCH MLS: 0.9 INJECTION, SOLUTION INTRAVENOUS at 20:52

## 2018-04-19 RX ADMIN — Medication SCH: at 20:53

## 2018-04-19 RX ADMIN — METRONIDAZOLE SCH MLS: 500 INJECTION, SOLUTION INTRAVENOUS at 11:31

## 2018-04-19 RX ADMIN — METRONIDAZOLE SCH MLS: 500 INJECTION, SOLUTION INTRAVENOUS at 17:14

## 2018-04-19 RX ADMIN — METRONIDAZOLE SCH MLS: 500 INJECTION, SOLUTION INTRAVENOUS at 05:23

## 2018-04-19 RX ADMIN — Medication SCH: at 08:46

## 2018-04-19 RX ADMIN — LISINOPRIL SCH MG: 10 TABLET ORAL at 10:35

## 2018-04-19 RX ADMIN — METRONIDAZOLE SCH MLS: 500 INJECTION, SOLUTION INTRAVENOUS at 23:40

## 2018-04-19 RX ADMIN — CIPROFLOXACIN SCH MLS: 2 INJECTION, SOLUTION INTRAVENOUS at 20:52

## 2018-04-19 RX ADMIN — CIPROFLOXACIN SCH MLS: 2 INJECTION, SOLUTION INTRAVENOUS at 09:22

## 2018-04-19 RX ADMIN — KETOROLAC TROMETHAMINE PRN MG: 30 INJECTION, SOLUTION INTRAMUSCULAR at 05:26

## 2018-04-19 RX ADMIN — SODIUM CHLORIDE SCH MLS: 0.9 INJECTION, SOLUTION INTRAVENOUS at 09:22

## 2018-04-19 NOTE — P.PN
Subjective


Date of Service: 04/19/18


Primary Care Provider: None


Chief Complaint: Abd pain


Pt seen and examined at bedside with RN. Chart reveiwed. Case D/w with GI. 

Currenlty on Full liquid Diet. Tolerating well no complains to offer. States 

his pain is better than before. 





Review of Systems


General: As per HPI





Physical Examination





- Vital Signs


Temperature: 97.7 F


Blood Pressure: 159/87


Pulse: 98


Respirations: 18


Pulse Ox (%): 96





- Physical Exam


General: Alert, In no apparent distress


HEENT: Atraumatic


Neck: Supple, JVD not distended


Respiratory: Clear to auscultation bilaterally, Normal air movement


Cardiovascular: Regular rate/rhythm, Normal S1 S2


Gastrointestinal: Normal bowel sounds, Tenderness (LLQ)


Musculoskeletal: No tenderness


Integumentary: No rashes


Neurological: Normal speech, Normal tone, Normal affect


Lymphatics: No axilla or inguinal lymphadenopathy





- Studies


Medications List Reviewed: Yes





Assessment & Plan





- Problems (Diagnosis)


(1) Colitis


Onset Date: 04/18/18   Current Visit: Yes   Status: Acute   


Plan: 


Abd pain with N/V. Still with N/V today. Improvement in Abd pain. 


-Cipro and flagyl 


-GI consulted. Appreciated Reccs. 


-Full Liquid diet now with IV fluids 











(2) Elevated transaminase level


Onset Date: 04/18/18   Current Visit: Yes   Status: Acute   


Plan: 


H/O of questionable Hepatitis. Improvement today


-Acute hepatitis panel pending. Past Hepatitis B ? positive. 


-IV fluids and GI consult. 











(3) HTN (hypertension)


Onset Date: 04/18/18   Current Visit: Yes   Status: Chronic   


Qualifiers: 


   Hypertension type: essential hypertension   Qualified Code(s): I10 - 

Essential (primary) hypertension   


Discharge Plan: Home


Plan to discharge in: 24 Hours





- Code Status/Comfort Care


Code Status Assessed: Yes


Critical Care: No

## 2018-04-20 LAB
ALBUMIN SERPL BCP-MCNC: 2.5 G/DL (ref 3.2–5.5)
ALP SERPL-CCNC: 297 IU/L (ref 42–121)
ALT SERPL W P-5'-P-CCNC: 107 IU/L (ref 10–60)
AST SERPL W P-5'-P-CCNC: 206 IU/L (ref 10–42)
BUN BLD-MCNC: 5 MG/DL (ref 6–20)
GLUCOSE SERPLBLD-MCNC: 116 MG/DL (ref 65–120)
HCT VFR BLD CALC: 34.6 % (ref 39.6–49)
LYMPHOCYTES # SPEC AUTO: 1.6 K/UL (ref 0.7–4.9)
MAGNESIUM SERPL-MCNC: 1.8 MG/DL (ref 1.8–2.5)
MCH RBC QN AUTO: 29.3 PG (ref 27–35)
MCV RBC: 85.5 FL (ref 80–100)
PMV BLD: 9.4 FL (ref 7.6–11.3)
POTASSIUM SERPL-SCNC: 3.9 MEQ/L (ref 3.6–5)
RBC # BLD: 4.04 M/UL (ref 4.33–5.43)

## 2018-04-20 RX ADMIN — Medication SCH ML: at 20:21

## 2018-04-20 RX ADMIN — METRONIDAZOLE SCH MLS: 500 INJECTION, SOLUTION INTRAVENOUS at 17:03

## 2018-04-20 RX ADMIN — LISINOPRIL SCH MG: 10 TABLET ORAL at 08:47

## 2018-04-20 RX ADMIN — METRONIDAZOLE SCH MLS: 500 INJECTION, SOLUTION INTRAVENOUS at 23:06

## 2018-04-20 RX ADMIN — METRONIDAZOLE SCH MLS: 500 INJECTION, SOLUTION INTRAVENOUS at 12:58

## 2018-04-20 RX ADMIN — Medication SCH ML: at 08:48

## 2018-04-20 RX ADMIN — METRONIDAZOLE SCH MLS: 500 INJECTION, SOLUTION INTRAVENOUS at 05:10

## 2018-04-20 RX ADMIN — CIPROFLOXACIN SCH MLS: 2 INJECTION, SOLUTION INTRAVENOUS at 08:47

## 2018-04-20 RX ADMIN — CIPROFLOXACIN SCH MLS: 2 INJECTION, SOLUTION INTRAVENOUS at 20:20

## 2018-04-20 RX ADMIN — SODIUM CHLORIDE SCH: 0.9 INJECTION, SOLUTION INTRAVENOUS at 06:00

## 2018-04-20 NOTE — P.PN
Subjective


Date of Service: 04/20/18


Primary Care Provider: None


Chief Complaint: Abd pain


Pt seen and examined at bedside with RN. Chart reveiwed. Case D/w with GI. 

Currenlty on Regular diet. Tolerating well no complains to offer. States his 

pain is better than before. 





Review of Systems


10-point ROS is otherwise unremarkable





Physical Examination





- Vital Signs


Temperature: 97.6 F


Blood Pressure: 165/95


Pulse: 84


Respirations: 16


Pulse Ox (%): 96





- Physical Exam


General: Alert, In no apparent distress


HEENT: Atraumatic, PERRLA, EOMI


Neck: Supple, JVD not distended


Respiratory: Clear to auscultation bilaterally, Normal air movement


Cardiovascular: Regular rate/rhythm, Normal S1 S2


Gastrointestinal: Normal bowel sounds, Ascites (LLQ)


Musculoskeletal: No tenderness


Integumentary: No rashes


Neurological: Normal speech, Normal tone, Normal affect


Lymphatics: No axilla or inguinal lymphadenopathy





- Studies


Medications List Reviewed: Yes





Assessment & Plan





- Problems (Diagnosis)


(1) Colitis


Onset Date: 04/18/18   Current Visit: Yes   Status: Acute   


Plan: 


Abd pain with N/V. Still with N/V today. Improvement in Abd pain. 


-Cipro and flagyl 


-GI consulted. Appreciated Reccs. 


-solid diet now 











(2) Elevated transaminase level


Onset Date: 04/18/18   Current Visit: Yes   Status: Acute   


Plan: 


H/O of questionable Hepatitis. Improvement today


-Acute hepatitis panel pending. Past Hepatitis B ? positive. 


-GI consult. Appreciate Reccs. 











(3) HTN (hypertension)


Onset Date: 04/18/18   Current Visit: Yes   Status: Chronic   


Qualifiers: 


   Hypertension type: essential hypertension   Qualified Code(s): I10 - 

Essential (primary) hypertension   


Discharge Plan: Home


Plan to discharge in: 24 Hours





- Code Status/Comfort Care


Code Status Assessed: Yes


Critical Care: No

## 2018-04-21 LAB
BUN BLD-MCNC: 7 MG/DL (ref 6–20)
GLUCOSE SERPLBLD-MCNC: 308 MG/DL (ref 65–120)
MAGNESIUM SERPL-MCNC: 1.7 MG/DL (ref 1.8–2.5)
POTASSIUM SERPL-SCNC: 4.1 MEQ/L (ref 3.6–5)

## 2018-04-21 RX ADMIN — METRONIDAZOLE SCH MLS: 500 INJECTION, SOLUTION INTRAVENOUS at 17:00

## 2018-04-21 RX ADMIN — ENOXAPARIN SODIUM SCH MG: 40 INJECTION SUBCUTANEOUS at 16:50

## 2018-04-21 RX ADMIN — METRONIDAZOLE SCH MLS: 500 INJECTION, SOLUTION INTRAVENOUS at 11:06

## 2018-04-21 RX ADMIN — Medication SCH ML: at 08:31

## 2018-04-21 RX ADMIN — HUMAN INSULIN SCH: 100 INJECTION, SOLUTION SUBCUTANEOUS at 21:00

## 2018-04-21 RX ADMIN — Medication SCH: at 21:00

## 2018-04-21 RX ADMIN — CIPROFLOXACIN SCH MLS: 2 INJECTION, SOLUTION INTRAVENOUS at 08:30

## 2018-04-21 RX ADMIN — PANTOPRAZOLE SODIUM SCH MG: 40 TABLET, DELAYED RELEASE ORAL at 06:10

## 2018-04-21 RX ADMIN — METRONIDAZOLE SCH MLS: 500 INJECTION, SOLUTION INTRAVENOUS at 06:10

## 2018-04-21 RX ADMIN — LISINOPRIL SCH MG: 10 TABLET ORAL at 08:30

## 2018-04-21 RX ADMIN — HUMAN INSULIN SCH: 100 INJECTION, SOLUTION SUBCUTANEOUS at 16:05

## 2018-04-21 RX ADMIN — SODIUM CHLORIDE SCH MLS: 0.9 INJECTION, SOLUTION INTRAVENOUS at 18:51

## 2018-04-21 RX ADMIN — SODIUM CHLORIDE TAB 1 GM SCH GM: 1 TAB at 16:50

## 2018-04-21 RX ADMIN — CIPROFLOXACIN SCH MLS: 2 INJECTION, SOLUTION INTRAVENOUS at 21:12

## 2018-04-22 LAB
ALBUMIN SERPL BCP-MCNC: 2.7 G/DL (ref 3.2–5.5)
ALP SERPL-CCNC: 251 IU/L (ref 42–121)
ALT SERPL W P-5'-P-CCNC: 98 IU/L (ref 10–60)
AST SERPL W P-5'-P-CCNC: 131 IU/L (ref 10–42)
BLD SMEAR INTERP: (no result)
BUN BLD-MCNC: 7 MG/DL (ref 6–20)
GLUCOSE SERPLBLD-MCNC: 117 MG/DL (ref 65–120)
HCT VFR BLD CALC: 33.7 % (ref 39.6–49)
LYMPHOCYTES # SPEC AUTO: 2.1 K/UL (ref 0.7–4.9)
MAGNESIUM SERPL-MCNC: 1.8 MG/DL (ref 1.8–2.5)
MCH RBC QN AUTO: 28.6 PG (ref 27–35)
MCV RBC: 86.1 FL (ref 80–100)
MORPHOLOGY BLD-IMP: (no result)
PMV BLD: 9.2 FL (ref 7.6–11.3)
POTASSIUM SERPL-SCNC: 5.2 MEQ/L (ref 3.6–5)
RBC # BLD: 3.91 M/UL (ref 4.33–5.43)

## 2018-04-22 RX ADMIN — HUMAN INSULIN SCH: 100 INJECTION, SOLUTION SUBCUTANEOUS at 20:50

## 2018-04-22 RX ADMIN — METRONIDAZOLE SCH MLS: 500 INJECTION, SOLUTION INTRAVENOUS at 06:51

## 2018-04-22 RX ADMIN — SODIUM CHLORIDE SCH: 0.9 INJECTION, SOLUTION INTRAVENOUS at 11:00

## 2018-04-22 RX ADMIN — SODIUM CHLORIDE SCH: 0.9 INJECTION, SOLUTION INTRAVENOUS at 03:00

## 2018-04-22 RX ADMIN — HUMAN INSULIN SCH: 100 INJECTION, SOLUTION SUBCUTANEOUS at 11:30

## 2018-04-22 RX ADMIN — HUMAN INSULIN SCH: 100 INJECTION, SOLUTION SUBCUTANEOUS at 07:30

## 2018-04-22 RX ADMIN — LISINOPRIL SCH MG: 10 TABLET ORAL at 08:40

## 2018-04-22 RX ADMIN — SODIUM CHLORIDE TAB 1 GM SCH GM: 1 TAB at 17:05

## 2018-04-22 RX ADMIN — PANTOPRAZOLE SODIUM SCH MG: 40 TABLET, DELAYED RELEASE ORAL at 06:52

## 2018-04-22 RX ADMIN — METRONIDAZOLE SCH MLS: 500 INJECTION, SOLUTION INTRAVENOUS at 17:04

## 2018-04-22 RX ADMIN — ENOXAPARIN SODIUM SCH MG: 40 INJECTION SUBCUTANEOUS at 17:04

## 2018-04-22 RX ADMIN — SODIUM CHLORIDE TAB 1 GM SCH GM: 1 TAB at 08:41

## 2018-04-22 RX ADMIN — CIPROFLOXACIN SCH MLS: 2 INJECTION, SOLUTION INTRAVENOUS at 20:06

## 2018-04-22 RX ADMIN — Medication SCH: at 08:42

## 2018-04-22 RX ADMIN — METRONIDAZOLE SCH MLS: 500 INJECTION, SOLUTION INTRAVENOUS at 00:51

## 2018-04-22 RX ADMIN — Medication SCH ML: at 20:06

## 2018-04-22 RX ADMIN — SODIUM CHLORIDE SCH MLS: 0.9 INJECTION, SOLUTION INTRAVENOUS at 00:51

## 2018-04-22 RX ADMIN — HUMAN INSULIN SCH: 100 INJECTION, SOLUTION SUBCUTANEOUS at 16:30

## 2018-04-22 RX ADMIN — METRONIDAZOLE SCH MLS: 500 INJECTION, SOLUTION INTRAVENOUS at 12:03

## 2018-04-22 RX ADMIN — CIPROFLOXACIN SCH MLS: 2 INJECTION, SOLUTION INTRAVENOUS at 08:41

## 2018-04-22 NOTE — PN
Subjective:  The patient currently is sitting on bed.  He is doing well.  He is able to tolerate his 
diet fine.  No fever.  No chills.  He continues to have some abdominal pain, but it is milder.



Objective:  Vital Signs:  Blood pressure is 132/84, respiratory rate 18, pulse 60, temperature 98.2. 


General:  The patient is alert and oriented on x3.  Does not look in any distress.  

HEENT:  Atraumatic, normocephalic.  PERRLA.  Oral mucosa is moist. 

Neck:  Supple.  No JVD.  No carotid bruits. 

Chest:  Clear to auscultation.  Good air entry. 

Heart:  Regular rate and rhythm.  S1, S2 normal.  No gallop or murmur. 

Abdomen:  Minimal tenderness diffusely mostly on the left lower quadrant. 

Extremities:  No clubbing, cyanosis, or edema.  No calf tenderness. 

Neurologic:  Grossly intact.



Laboratory Data:  Labs today showed CBC with white blood cells of 7.3, hemoglobin 11.2, platelets 146
.  PT/INR was normal.  Chemistry within normal except for sodium 134 up from 123, potassium 5.2.  Thang
irubin is down to 1.9.  ALT at 131, AST of 98, alkaline phosphatase of 251.



Assessment And Plan:  

1.Acute colitis, continue to improve.  The patient is tolerating a diet, but liver function tests st
ill high.  I will continue IV antibiotics for 1 more day and hopefully if tomorrow bilirubin was down
 to close to normal and GI okay with that, we will be able to discharge patient on oral antibiotics.

2.Hyponatremia was severe, but resolved today.  Sodium is up to 134.  We will observe.  The patient 
on sodium tablet.  If he continues to improve, we could stop that tomorrow.

3.Elevated transaminases, etiology I am not sure, but they are improving, so I will not order abdomi
nal ultrasound today.

4.Hepatitis profile was negative.

5.Hypertension, better controlled with lisinopril 20 that I increased from yesterday.  Continue p.r.
n. hydralazine.

6.Hypomagnesemia.  We will need to place IV.

7.Continue DVT prophylaxis with Lovenox.

8.Hyperkalemia.  I will start the patient on a dose of Kayexalate today.





MT/PRASHANT

DD:  04/22/2018 12:59:31Voice ID:  [id number]

DT:  04/22/2018 17:49:12Report ID:  [id number]

## 2018-04-22 NOTE — PN
Subjective:  Currently, the patient lying in bed.  He looks comfortable.  He already had his lunch an
d he has no nausea, no vomiting.  He continued to have mild abdominal pain.  No rebound, no guarding 
upon palpation.  Bowel movement back to solid.



Objective:  Vital Signs:  Currently, vital signs, blood pressure 159/86, respiratory rate 16, pulse 1
09, temperature 97.2. 

General:  He is fully alert, oriented x3.  Does not look in any distress.  

HEENT:  Atraumatic, normocephalic.  PERRLA.  Oral mucosa is moist. 

Neck:  Supple.  No JVD.  No carotid bruits. 

Chest:  Clear to auscultation.  Good air entry. 

Heart:  Regular rate and rhythm.  S1, S2 normal.  No gallop or murmur. 

Abdomen:  Minimal tenderness on the left lower quadrant. 

Extremities:  No clubbing, no cyanosis, or edema.  No calf tenderness. 

Neurologic:  Grossly intact.



Laboratory Data:  Labs drawn today showed CBC was normal except for hemoglobin 11.8, platelets 103 up
 from 83 yesterday.  On the chemistry, sodium 123, down; chloride 91; kidney function was normal.  Gl
ucose 308.  Magnesium 1.7.  LFTs not done.



Assessment And Plan:  

1.Acute colitis, improving.  The patient is tolerating diet well.  Continue patient on IV antibiotic
 with Cipro and Flagyl.  GI noted.  I do not have LFTs today.

2.Hyponatremia, severe.  I will treat the patient on normal saline and place him on sodium tablet.

3.Elevated transaminases have been trending down, but there is no LFT today.  We will reorder in a.m
.

4.Hepatitis profile ordered, but results showed negative.

5.Hypertension, not well controlled.  The patient on lisinopril 10, I will increase that to 20.  I w
ill place the patient on hydralazine 10 mg every 6 hours as needed.

6.Hypomagnesemia.  I will replace it with IV.

7.We will start deep vein thrombosis prophylaxis with Lovenox.





MT/PRASHANT

DD:  04/21/2018 13:48:18Voice ID:  [id number]

DT:  04/22/2018 01:20:22Report ID:  [id number]

## 2018-04-23 VITALS — OXYGEN SATURATION: 97 %

## 2018-04-23 VITALS — TEMPERATURE: 98.1 F | DIASTOLIC BLOOD PRESSURE: 89 MMHG | SYSTOLIC BLOOD PRESSURE: 167 MMHG

## 2018-04-23 LAB
A1C COMPONENT: 0.53 MG/DL
ALBUMIN SERPL BCP-MCNC: 2.6 G/DL (ref 3.2–5.5)
ALP SERPL-CCNC: 232 IU/L (ref 42–121)
ALT SERPL W P-5'-P-CCNC: 97 IU/L (ref 10–60)
AST SERPL W P-5'-P-CCNC: 114 IU/L (ref 10–42)
BUN BLD-MCNC: 9 MG/DL (ref 6–20)
GLUCOSE SERPLBLD-MCNC: 118 MG/DL (ref 65–120)
MAGNESIUM SERPL-MCNC: 1.6 MG/DL (ref 1.8–2.5)
POTASSIUM SERPL-SCNC: 5.3 MEQ/L (ref 3.6–5)

## 2018-04-23 RX ADMIN — METRONIDAZOLE SCH: 500 INJECTION, SOLUTION INTRAVENOUS at 12:00

## 2018-04-23 RX ADMIN — METRONIDAZOLE SCH MLS: 500 INJECTION, SOLUTION INTRAVENOUS at 00:51

## 2018-04-23 RX ADMIN — SODIUM CHLORIDE TAB 1 GM SCH GM: 1 TAB at 08:26

## 2018-04-23 RX ADMIN — CIPROFLOXACIN SCH MLS: 2 INJECTION, SOLUTION INTRAVENOUS at 08:27

## 2018-04-23 RX ADMIN — LISINOPRIL SCH MG: 10 TABLET ORAL at 08:26

## 2018-04-23 RX ADMIN — Medication SCH ML: at 08:27

## 2018-04-23 RX ADMIN — PANTOPRAZOLE SODIUM SCH MG: 40 TABLET, DELAYED RELEASE ORAL at 05:38

## 2018-04-23 RX ADMIN — HUMAN INSULIN SCH: 100 INJECTION, SOLUTION SUBCUTANEOUS at 11:30

## 2018-04-23 RX ADMIN — METRONIDAZOLE SCH MLS: 500 INJECTION, SOLUTION INTRAVENOUS at 05:38

## 2018-04-23 RX ADMIN — HUMAN INSULIN SCH: 100 INJECTION, SOLUTION SUBCUTANEOUS at 07:30

## 2018-04-23 NOTE — P.DS
Admission Date: 04/17/18


Discharge Date: 04/23/18


Primary Care Provider: None


Disposition: ROUTINE DISCHARGE


Discharge Condition: GOOD


Reason for Admission: Abd pain


Consultations: 





GI 





- Problems


(1) Colitis


Onset Date: 04/18/18   Status: Acute   





(2) Elevated transaminase level


Onset Date: 04/18/18   Status: Acute   





(3) HTN (hypertension)


Onset Date: 04/18/18   Status: Chronic   


Qualifiers: 


   Hypertension type: essential hypertension   Qualified Code(s): I10 - 

Essential (primary) hypertension   


Brief History of Present Illness: 





This is a 57-year-old male with significant past medical history of 

hypertension who presented to the ED complaining of having some diffuse 

abdominal pain nausea vomiting and watery diarrhea.  Patient stated that his 

symptoms have been going on for about 1 week and a half and has been getting 

progressively worse.  He has not been able to keep anything down other than 

soup at night time which he eventually throws up in the morning.  Patient 

states that in the morning he starts having some dry heaving and then noticed 

that he cough off all blood.  Patient says that he has not had similar episodes 

in the past and is the 1st time that he noticed the abdominal pain.  Patient is 

not followup with a primary care provider or does not have any GI doctor.  

Patient is states that he takes about 1-2 alcoholic beverages at night time 

mostly beer.


Hospital Course: 





Overall during the hospital course patient remained stable





The patient initially was admitted to the hospital for abdominal pain nausea 

vomiting and diarrhea. Had abd CT which was consistent with Colitis.  The 

patient also had elevated LFT's and hypoalbuminemia which is most likely 

consistent with liver cirrhosis which is worse due to dehydration.  Patient was 

initially started on IV Cipro and Flagyl and had a clear liquid diet here in 

the hospital.  Patient remained on IV fluids.  However date the patient did 

well and was able to tolerate diet well.  Denies any nausea vomiting or 

diarrhea.  Patient thus was advanced to a regular diet.  Patient's LFTs however 

remained elevated here in the hospital this patient was kept until the T 

bilirubin was within normal limits.  Today patient T bilirubin was 1.6 along 

with AST and ALT are also markedly low from the beginning and this patient was 

discharged home under stable condition.  GI was consulted while patient was 

here in the hospital and agreed with the above plan and recommended the patient 

have an outpatient colonoscopy done once he is discharged from here and have 

his acute illness is resolved.  Patient thus was given a followup appointment 

with GI as well.  The patient was to follow up with primary care doctor in 2 

weeks and GI in 2 weeks.  Patient was to take p.o. Cipro and Flagyl for total 

of 10 days. Abstain from alcohol as well. 


Vital Signs/Physical Exam: 














Temp Pulse Resp BP Pulse Ox


 


 98.1 F   84   16   167/89 H  97 


 


 04/23/18 08:00  04/23/18 08:26  04/23/18 08:00  04/23/18 08:26  04/23/18 08:00








General: Alert, In no apparent distress


HEENT: Atraumatic, PERRLA, EOMI, Scleral icterus


Neck: Supple, JVD not distended


Respiratory: Clear to auscultation bilaterally, Normal air movement


Cardiovascular: Regular rate/rhythm, Normal S1 S2


Gastrointestinal: Normal bowel sounds, No tenderness


Musculoskeletal: No tenderness


Integumentary: No rashes


Neurological: Normal speech, Normal tone, Normal affect


Lymphatics: No axilla or inguinal lymphadenopathy


Laboratory Data at Discharge: 














WBC  7.3 K/uL (4.3-10.9)  D 04/22/18  05:50    


 


Hgb  11.2 g/dL (13.6-17.9)  L  04/22/18  05:50    


 


Hct  33.7 % (39.6-49.0)  L  04/22/18  05:50    


 


Plt Count  146 K/uL (152-406)  L D 04/22/18  05:50    


 


PT  10.4 SECONDS (9.5-12.5)   04/18/18  04:29    


 


INR  0.88   04/18/18  04:29    


 


APTT  26.4 SECONDS (24.3-36.9)   04/18/18  04:29    


 


Sodium  134 mEq/L (135-145)  L  04/23/18  03:55    


 


Potassium  5.3 mEq/L (3.6-5.0)  H  04/23/18  03:55    


 


BUN  9 mg/dL (6-20)   04/23/18  03:55    


 


Creatinine  0.82 mg/dL (0.61-1.24)   04/23/18  03:55    


 


Glucose  118 mg/dL ()   04/23/18  03:55    


 


Phosphorus  3.5 mg/dL (2.5-4.3)   04/18/18  04:29    


 


Magnesium  1.6 mg/dL (1.8-2.5)  L  04/23/18  03:55    


 


Total Bilirubin  1.6 mg/dL (0.3-1.2)  H  04/23/18  08:42    


 


AST  114 IU/L (10-42)  H  04/23/18  08:42    


 


ALT  97 IU/L (10-60)  H  04/23/18  08:42    


 


Alkaline Phosphatase  232 IU/L ()  H  04/23/18  08:42    


 


Lipase  49 U/L (22-51)   04/17/18  12:40    








Home Medications: 








Ciprofloxacin HCl [Cipro 500 MG Tablet] 500 mg PO DAILY #10 tab 04/23/18 


Lisinopril [Prinivil*] 10 mg PO DAILY #30 tab 04/23/18 


Metronidazole [Flagyl] 500 mg PO Q8H #30 tablet 04/23/18 





New Medications: 


Ciprofloxacin HCl [Cipro 500 MG Tablet] 500 mg PO DAILY #10 tab


Lisinopril [Prinivil*] 10 mg PO DAILY #30 tab


Metronidazole [Flagyl] 500 mg PO Q8H #30 tablet


Patient Discharge Instructions: Please f/u with GI in 1 week for Elevated 

Transaminase and cirrohis.  New medication.  Ciprofloxacin and Flagyl for 10 

days.  Please refrain from using Alcohol at home.


Followup: 


Williams Sheridan MD [ACTIVE - CAN ADMIT] - 1 Week (call to schedule an appointment)

## 2018-05-08 ENCOUNTER — HOSPITAL ENCOUNTER (EMERGENCY)
Dept: HOSPITAL 97 - ER | Age: 58
Discharge: HOME | End: 2018-05-08
Payer: SELF-PAY

## 2018-05-08 VITALS — SYSTOLIC BLOOD PRESSURE: 169 MMHG | DIASTOLIC BLOOD PRESSURE: 92 MMHG

## 2018-05-08 VITALS — TEMPERATURE: 98.3 F | OXYGEN SATURATION: 97 %

## 2018-05-08 DIAGNOSIS — A09: Primary | ICD-10-CM

## 2018-05-08 DIAGNOSIS — I10: ICD-10-CM

## 2018-05-08 DIAGNOSIS — F17.210: ICD-10-CM

## 2018-05-08 LAB
ALBUMIN SERPL BCP-MCNC: 3.8 G/DL (ref 3.2–5.5)
ALP SERPL-CCNC: 172 IU/L (ref 42–121)
ALT SERPL W P-5'-P-CCNC: 20 IU/L (ref 10–60)
AST SERPL W P-5'-P-CCNC: 22 IU/L (ref 10–42)
BUN BLD-MCNC: 16 MG/DL (ref 6–20)
CKMB CREATINE KINASE MB: 1.2 NG/ML (ref 0.3–4)
GLUCOSE SERPLBLD-MCNC: 111 MG/DL (ref 65–120)
HCT VFR BLD CALC: 34.5 % (ref 39.6–49)
INR BLD: 0.93
LIPASE SERPL-CCNC: 33 U/L (ref 22–51)
LYMPHOCYTES # SPEC AUTO: 1.7 K/UL (ref 0.7–4.9)
MAGNESIUM SERPL-MCNC: 1.7 MG/DL (ref 1.8–2.5)
MCH RBC QN AUTO: 30.2 PG (ref 27–35)
MCV RBC: 86.7 FL (ref 80–100)
PMV BLD: 7.8 FL (ref 7.6–11.3)
POTASSIUM SERPL-SCNC: 4.2 MEQ/L (ref 3.6–5)
RBC # BLD: 3.98 M/UL (ref 4.33–5.43)

## 2018-05-08 PROCEDURE — 83735 ASSAY OF MAGNESIUM: CPT

## 2018-05-08 PROCEDURE — 99285 EMERGENCY DEPT VISIT HI MDM: CPT

## 2018-05-08 PROCEDURE — 85730 THROMBOPLASTIN TIME PARTIAL: CPT

## 2018-05-08 PROCEDURE — 71045 X-RAY EXAM CHEST 1 VIEW: CPT

## 2018-05-08 PROCEDURE — 93970 EXTREMITY STUDY: CPT

## 2018-05-08 PROCEDURE — 82550 ASSAY OF CK (CPK): CPT

## 2018-05-08 PROCEDURE — 93005 ELECTROCARDIOGRAM TRACING: CPT

## 2018-05-08 PROCEDURE — 36415 COLL VENOUS BLD VENIPUNCTURE: CPT

## 2018-05-08 PROCEDURE — 83690 ASSAY OF LIPASE: CPT

## 2018-05-08 PROCEDURE — 83880 ASSAY OF NATRIURETIC PEPTIDE: CPT

## 2018-05-08 PROCEDURE — 96365 THER/PROPH/DIAG IV INF INIT: CPT

## 2018-05-08 PROCEDURE — 81003 URINALYSIS AUTO W/O SCOPE: CPT

## 2018-05-08 PROCEDURE — 82553 CREATINE MB FRACTION: CPT

## 2018-05-08 PROCEDURE — 80048 BASIC METABOLIC PNL TOTAL CA: CPT

## 2018-05-08 PROCEDURE — 84484 ASSAY OF TROPONIN QUANT: CPT

## 2018-05-08 PROCEDURE — 85610 PROTHROMBIN TIME: CPT

## 2018-05-08 PROCEDURE — 96368 THER/DIAG CONCURRENT INF: CPT

## 2018-05-08 PROCEDURE — 96375 TX/PRO/DX INJ NEW DRUG ADDON: CPT

## 2018-05-08 PROCEDURE — 80076 HEPATIC FUNCTION PANEL: CPT

## 2018-05-08 PROCEDURE — 96372 THER/PROPH/DIAG INJ SC/IM: CPT

## 2018-05-08 PROCEDURE — 85025 COMPLETE CBC W/AUTO DIFF WBC: CPT

## 2018-05-08 NOTE — ER
Nurse's Notes                                                                                     

 [facility]                                                                

Name: Nitin Ricci                                                                             

Age: 57 yrs                                                                                       

Sex: Male                                                                                         

: 1960                                                                                   

MRN: [id number]                                                                                   

Arrival Date: 2018                                                                          

Time: 08:27                                                                                       

Account#: [id number]                                                                            

Bed 6                                                                                             

Private MD:                                                                                       

Diagnosis: Infectious gastroenteritis and colitis, unspecified                                    

                                                                                                  

Presentation:                                                                                     

                                                                                             

08:39 Presenting complaint: Patient states: I was here in April with colitis and it hurts the tw2 

      same again, started pain yesterday and then this morning V/D. Transition of care:           

      patient was not received from another setting of care. Onset of symptoms was May 08,        

      2018. Initial Sepsis Screen: Does the patient meet any 2 criteria? No. Patient's            

      initial sepsis screen is negative. Does the patient have a suspected source of              

      infection? No. Patient's initial sepsis screen is negative. Care prior to arrival: None.    

08:39 Method Of Arrival: Ambulatory                                                           tw2 

08:39 Acuity: PREETI 3                                                                           tw2 

                                                                                                  

Historical:                                                                                       

- Allergies:                                                                                      

08:42 No Known Drug Allergies;                                                                tw2 

- Home Meds:                                                                                      

08:42 hydrochlorothiazide 25 mg Oral tab 1 tab once daily [Active];                           tw2 

- PMHx:                                                                                           

08:42 Hypertension;                                                                           tw2 

- PSHx:                                                                                           

08:42 Appendectomy; Cholecystectomy;                                                          tw2 

                                                                                                  

- Immunization history:: Adult Immunizations up to date.                                          

- Family history:: not pertinent.                                                                 

- Social history:: Smoking status: Patient uses tobacco products, smokes one pack                 

  cigarettes per day.                                                                             

                                                                                                  

                                                                                                  

Screenin:30 Abuse screen: Denies threats or abuse. Nutritional screening: No deficits noted.        tw2 

      Tuberculosis screening: No symptoms or risk factors identified. Fall Risk None              

      identified.                                                                                 

                                                                                                  

Assessment:                                                                                       

08:40 General: Appears in no apparent distress. Behavior is calm, cooperative, appropriate    tw2 

      for age. Pain: Complains of pain in right lower quadrant and right upper quadrant.          

      Neuro: Level of Consciousness is awake, alert, obeys commands, Oriented to person,          

      place, time, situation. Cardiovascular: Denies chest pain, shortness of breath, Heart       

      tones S1 S2 Capillary refill < 3 seconds Patient's skin is warm and dry. Respiratory:       

      Airway is patent Respiratory effort is even, unlabored, Respiratory pattern is regular,     

      symmetrical, Breath sounds are clear bilaterally. GI: Bowel sounds present X 4 quads.       

      Abd is soft X 4 quads Abdomen is tender to palpation in right lower quadrant and left       

      lower quadrant Reports lower abdominal pain, diarrhea, nausea. : No signs and/or          

      symptoms were reported regarding the genitourinary system. EENT: No signs and/or            

      symptoms were reported regarding the EENT system. Derm: Skin is intact, is healthy with     

      good turgor, Skin temperature is warm. Musculoskeletal: Range of motion: intact in all      

      extremities.                                                                                

09:28 Reassessment: pt taken via stretcher to US at this time. NAD, not available for vs.     tw2 

09:55 Reassessment: Patient appears in no apparent distress at this time. Patient and/or      tw2 

      family updated on plan of care and expected duration. Pain level reassessed. Patient is     

      alert, oriented x 3, equal unlabored respirations, skin warm/dry/pink. Patient states       

      feeling better.                                                                             

09:55 Reassessment: Dr. Bob at bedside at this time.                                        tw2 

10:37 Reassessment: Patient appears in no apparent distress at this time. Patient and/or      tw2 

      family updated on plan of care and expected duration. Pain level reassessed. Patient is     

      alert, oriented x 3, equal unlabored respirations, skin warm/dry/pink. Patient states       

      feeling better.                                                                             

11:28 Reassessment: Patient appears in no apparent distress at this time. Patient and/or      tw2 

      family updated on plan of care and expected duration. Pain level reassessed. Patient is     

      alert, oriented x 3, equal unlabored respirations, skin warm/dry/pink. Patient states       

      feeling better.                                                                             

                                                                                                  

Vital Signs:                                                                                      

08:40  / 91; Pulse 103; Resp 18; Temp 98.3; Pulse Ox 97% on R/A; Weight 91.17 kg (R);   tw2 

      Height 6 ft. 3 in. (190.50 cm) (R); Pain 6/10;                                              

09:54  / 92; Pulse 95; Resp 17; Pulse Ox 97% on R/A; Pain 4/10;                         tw2 

11:28  / 92; Pulse 92; Resp 18; Pulse Ox 97% on R/A; Pain 3/10;                         tw2 

08:40 Body Mass Index 25.12 (91.17 kg, 190.50 cm)                                             tw2 

                                                                                                  

ED Course:                                                                                        

08:27 Patient arrived in ED.                                                                  [id number] 

08:38 Reed Rausch MD is Attending Physician.                                             markel 

08:39 Lim, Shannon, RN is Primary Nurse.                                                        tw2 

08:40 Triage completed.                                                                       tw2 

08:40 Arm band placed on.                                                                     tw2 

08:40 Bed in low position. Call light in reach. Side rails up X 1. Cardiac monitor on. Pulse  tw2 

      ox on. NIBP on.                                                                             

08:48 X-ray completed. Portable x-ray completed in exam room. Patient tolerated procedure     sw  

      well.                                                                                       

08:55 X-ray completed.                                                                        sw  

08:55 No provider procedures requiring assistance completed. Inserted saline lock: 20 gauge   tw2 

      in right antecubital area, using aseptic technique. Blood collected. Inserted saline        

      lock: 20 gauge in left forearm, using aseptic technique.                                    

08:57 XRAY Chest (1 view) In Process Unspecified.                                             EDMS

09:13 Note: waiting for dr to call back for venous doppler clarification.                     hr  

09:28 Patient taken to ultrasound. via wheelchair.                                            hr  

09:36 US Extremity Venou Bilateral In Process Unspecified.                                    EDMS

09:46 Ultrasound completed. Patient tolerated well.                                           hr  

09:46 Patient moved back from ultrasound.                                                     hr  

09:53 Urmila Bob MD is Hospitalizing Provider.                                           markel 

10:01 Urmila Bob MD .                                                       rp3 

10:01 Sheldon Miller MD is Referral Physician.                                              rp3 

10:11 Awaiting: COMPLETION OF IV MEDICATION PRIOR TO DISCHARGE.                               tw2 

11:18 EKG done, by EKG tech. reviewed by Reed Rausch MD.                                   at1 

11:28 IV discontinued, intact, bleeding controlled, No redness/swelling at site. Pressure     tw2 

      dressing applied.                                                                           

                                                                                                  

Administered Medications:                                                                         

09:06 Drug: NS 0.9% 1000 ml Route: IV; Rate: 1 bolus; Site: left forearm;                     tw2 

10:00 Follow up: Response: No adverse reaction; IV Status: Completed infusion; IV Intake:     tw2 

      1000ml                                                                                      

09:12 Drug: Lovenox 1 mg/kg Route: Sub-Q; Site: right lower abdomen;                          tw2 

10:00 Follow up: Response: No adverse reaction                                                tw2 

09:13 Drug: Pepcid 20 mg Route: IVP; Site: left forearm;                                      tw2 

10:00 Follow up: Response: No adverse reaction                                                tw2 

09:16 Drug: Zofran 4 mg Route: IVP; Site: left forearm;                                       tw2 

10:00 Follow up: Response: No adverse reaction; Nausea is decreased                           tw2 

09:18 Drug: morphine 4 mg Route: IVP; Site: left forearm;                                     tw2 

10:00 Follow up: Response: No adverse reaction; Pain is decreased                             tw2 

09:22 Drug: Flagyl 500 mg Volume: 100 ml; Route: IVPB; Rate: 200 ml/hr; Infused Over: 30      tw2 

      mins; Site: left forearm;                                                                   

09:55 Follow up: Response: No adverse reaction; IV Status: Completed infusion                 tw2 

09:23 Drug: Cipro 400 mg Volume: 200 ml; Route: IVPB; Infused Over: 60 mins; Site: left       tw2 

      forearm;                                                                                    

10:25 Follow up: Response: No adverse reaction; IV Status: Completed infusion                 tw2 

10:25 Drug: Magnesium Sulfate 1 grams Route: IVPB; Infused Over: 1 hrs; Site: left forearm;   tw2 

10:25 Follow up: Response: No adverse reaction; IV Status: Completed infusion                 tw2 

11:28 Follow up: Response: No adverse reaction; IV Status: Completed infusion                 tw2 

10:25 Drug: Thiamine 100 mg Route: IV; Rate: bolus; Site: left forearm;                       tw2 

10:27 Follow up: Response: No adverse reaction; IV Status: Completed infusion                 tw2 

                                                                                                  

                                                                                                  

Intake:                                                                                           

10:00 IV: 1000ml; Total: 1000ml.                                                              tw2 

                                                                                                  

Outcome:                                                                                          

09:55 Decision to Hospitalize by Provider.                                                    markel 

10:03 Discharge ordered by MD.                                                                rp3 

11:28 Discharged to home ambulatory.                                                          tw2 

11:28 Condition: stable                                                                           

11:28 Discharge instructions given to patient, Instructed on discharge instructions, follow       

      up and referral plans. Demonstrated understanding of instructions, follow-up care.          

11:29 Patient left the ED.                                                                    tw2 

                                                                                                  

Signatures:                                                                                       

Dispatcher [employer]                           EDReed Galarza MD MD cha Rod, Haley hr gonzales, Amanda, EKG Tech              EKG Tat1                                                  

Selene Rivas Tara, RN                          RN   tw2                                                  

Melodie Sherman4                                                  

Urmila Bob MD MD   rp3                                                  

                                                                                                  

Corrections: (The following items were deleted from the chart)                                    

10:37 09:54 Pulse 95bpm; Resp 17bpm; Pulse Ox 97% RA; Pain 4/10; tw2                          tw2 

                                                                                                  

**************************************************************************************************

## 2018-05-08 NOTE — RAD REPORT
EXAM DESCRIPTION:  RAD - Chest Single View - 5/8/2018 8:56 am

 

CLINICAL HISTORY:  Chest pain.

 

COMPARISON:  04/17/2018

 

FINDINGS:  Portable technique limits examination quality.

 

The lungs are grossly clear. The heart is normal in size. No displaced fractures.

 

IMPRESSION:  No acute intrathoracic process suspected.

## 2018-05-08 NOTE — EKG
Test Date:    2018-05-08               Test Time:    10:37:59

Technician:   ESTEE                                     

                                                     

MEASUREMENT RESULTS:                                       

Intervals:                                           

Rate:         85                                     

IL:           142                                    

QRSD:         96                                     

QT:           372                                    

QTc:          442                                    

Axis:                                                

P:            56                                     

IL:           142                                    

QRS:          69                                     

T:            61                                     

                                                     

INTERPRETIVE STATEMENTS:                                       

                                                     

Normal sinus rhythm

Normal ECG

Compared to ECG 04/17/2018 11:12:10

No significant changes



Electronically Signed On 05-08-18 11:53:59 CDT by Juan Carlos Bustillos

## 2018-05-08 NOTE — EDPHYS
Physician Documentation                                                                           

 [facility]                                                                

Name: Nitin Ricci                                                                             

Age: 57 yrs                                                                                       

Sex: Male                                                                                         

: 1960                                                                                   

MRN: [id number]                                                                                   

Arrival Date: 2018                                                                          

Time: 08:27                                                                                       

Account#: [id number]                                                                            

Bed 6                                                                                             

Private MD:                                                                                       

ED Physician Reed Rausch                                                                      

HPI:                                                                                              

                                                                                             

09:01 This 57 yrs old  Male presents to ER via Ambulatory with complaints of         markel 

      Abdominal Pain.                                                                             

09:01 The patient presents with abdominal pain in the right upper quadrant, right lower       markel 

      quadrant. Onset: The symptoms/episode began/occurred 2 day(s) ago. The patient presents     

      to the emergency department with diarrhea, abdominal pain, of the right upper quadrant      

      and right lower quadrant. Onset: The symptoms/episode began/occurred 2 day(s) ago.          

      Possible causes: unknown. The symptoms are aggravated by nothing. The symptoms are          

      alleviated by nothing. Associated signs and symptoms: The patient has no apparent           

      associated signs or symptoms. The symptoms do not radiate. Associated signs and             

      symptoms: none. The symptoms are described as crampy, stabbing. Modifying factors: The      

      symptoms are alleviated by nothing, the symptoms are aggravated by nothing.                 

                                                                                                  

Historical:                                                                                       

- Allergies:                                                                                      

08:42 No Known Drug Allergies;                                                                tw2 

- Home Meds:                                                                                      

08:42 hydrochlorothiazide 25 mg Oral tab 1 tab once daily [Active];                           tw2 

- PMHx:                                                                                           

08:42 Hypertension;                                                                           tw2 

- PSHx:                                                                                           

08:42 Appendectomy; Cholecystectomy;                                                          tw2 

                                                                                                  

- Immunization history:: Adult Immunizations up to date.                                          

- Family history:: not pertinent.                                                                 

- Social history:: Smoking status: Patient uses tobacco products, smokes one pack                 

  cigarettes per day.                                                                             

                                                                                                  

                                                                                                  

ROS:                                                                                              

09:01 Constitutional: Negative for fever, chills, and weight loss, Eyes: Negative for injury, markel 

      pain, redness, and discharge, ENT: Negative for injury, pain, and discharge, Neck:          

      Negative for injury, pain, and swelling, Cardiovascular: Negative for chest pain,           

      palpitations, and edema, Respiratory: Negative for shortness of breath, cough,              

      wheezing, and pleuritic chest pain, Back: Negative for injury and pain, : Negative        

      for injury, bleeding, discharge, and swelling, MS/Extremity: Negative for injury and        

      deformity, Skin: Negative for injury, rash, and discoloration, Neuro: Negative for          

      headache, weakness, numbness, tingling, and seizure, Psych: Negative for depression,        

      anxiety, suicide ideation, homicidal ideation, and hallucinations, Allergy/Immunology:      

      Negative for hives, rash, and allergies, Endocrine: Negative for neck swelling,             

      polydipsia, polyuria, polyphagia, and marked weight changes, Hematologic/Lymphatic:         

      Negative for swollen nodes, abnormal bleeding, and unusual bruising.                        

09:01 Abdomen/GI: Positive for abdominal pain, nausea, diarrhea, abdominal cramps, of the         

      right upper quadrant and right lower quadrant.                                              

                                                                                                  

Exam:                                                                                             

09:01 Constitutional:  This is a well developed, well nourished patient who is awake, alert,  markel 

      and in no acute distress. Head/Face:  Normocephalic, atraumatic. Eyes:  Pupils equal        

      round and reactive to light, extra-ocular motions intact.  Lids and lashes normal.          

      Conjunctiva and sclera are non-icteric and not injected.  Cornea within normal limits.      

      Periorbital areas with no swelling, redness, or edema. ENT:  Nares patent. No nasal         

      discharge, no septal abnormalities noted.  Tympanic membranes are normal and external       

      auditory canals are clear.  Oropharynx with no redness, swelling, or masses, exudates,      

      or evidence of obstruction, uvula midline.  Mucous membranes moist. Neck:  Trachea          

      midline, no thyromegaly or masses palpated, and no cervical lymphadenopathy.  Supple,       

      full range of motion without nuchal rigidity, or vertebral point tenderness.  No            

      Meningismus. Chest/axilla:  Normal chest wall appearance and motion.  Nontender with no     

      deformity.  No lesions are appreciated. Respiratory:  Lungs have equal breath sounds        

      bilaterally, clear to auscultation and percussion.  No rales, rhonchi or wheezes noted.     

       No increased work of breathing, no retractions or nasal flaring. Back:  No spinal          

      tenderness.  No costovertebral tenderness.  Full range of motion. Male :  Normal          

      genitalia with no discharge or lesions. Skin:  Warm, dry with normal turgor.  Normal        

      color with no rashes, no lesions, and no evidence of cellulitis. MS/ Extremity:  Pulses     

      equal, no cyanosis.  Neurovascular intact.  Full, normal range of motion. Neuro:  Awake     

      and alert, GCS 15, oriented to person, place, time, and situation.  Cranial nerves          

      II-XII grossly intact.  Motor strength 5/5 in all extremities.  Sensory grossly intact.     

       Cerebellar exam normal.  Normal gait. Psych:  Awake, alert, with orientation to            

      person, place and time.  Behavior, mood, and affect are within normal limits.               

09:01 Cardiovascular: Rate:                                                                       

09:01 Cardiovascular: Rate: tachycardic, Rhythm: regular, Pulses: Pulses are 4+ in bilateral      

      radial, brachial, femoral, popliteal, posterior tibial and and dorsalis pedis               

      arteries.. Heart sounds: normal, normal S1and S2, no S3 or S4, no murmur, no rub, no        

      gallop, Edema: is not appreciated, JVD: is not appreciated.                                 

                                                                                                  

Vital Signs:                                                                                      

08:40  / 91; Pulse 103; Resp 18; Temp 98.3; Pulse Ox 97% on R/A; Weight 91.17 kg (R);   tw2 

      Height 6 ft. 3 in. (190.50 cm) (R); Pain 6/10;                                              

09:54  / 92; Pulse 95; Resp 17; Pulse Ox 97% on R/A; Pain 4/10;                         tw2 

11:28  / 92; Pulse 92; Resp 18; Pulse Ox 97% on R/A; Pain 3/10;                         tw2 

08:40 Body Mass Index 25.12 (91.17 kg, 190.50 cm)                                             tw2 

                                                                                                  

MDM:                                                                                              

08:38 Patient medically screened.                                                             [facility] 

09:05 Data reviewed: vital signs, nurses notes, lab test result(s), EKG, radiologic studies,  [facility] 

      CT scan, plain films.                                                                       

                                                                                                  

05                                                                                             

08:39 Order name: Basic Metabolic Panel; Complete Time: 10:05                                 [facility] 

                                                                                             

08:39 Order name: BNP; Complete Time: 10:05                                                   [facility] 

                                                                                             

08:39 Order name: CBC with Diff; Complete Time: 09:31                                         [facility] 

                                                                                             

08:39 Order name: Ckmb; Complete Time: 10:05                                                  [facility] 

                                                                                             

08:39 Order name: CPK; Complete Time: 10:05                                                   [facility] 

                                                                                             

08:39 Order name: LFT's; Complete Time: 10:05                                                 [facility] 

                                                                                             

08:39 Order name: Magnesium; Complete Time: 10:05                                             [facility] 

                                                                                             

08:39 Order name: PT-INR; Complete Time: 09:31                                                [facility] 

                                                                                             

08:39 Order name: Ptt, Activated; Complete Time: 09:31                                        [facility] 

                                                                                             

08:39 Order name: Troponin (emerg Dept Use Only); Complete Time: 10:05                        [facility] 

                                                                                             

08:39 Order name: XRAY Chest (1 view); Complete Time: 09:31                                   [facility] 

                                                                                             

08:39 Order name: Lipase; Complete Time: 10:05                                                [facility] 

                                                                                             

10:47 Order name: Urine Dipstick--Ancillary (enter results)                                     

                                                                                             

10:48 Order name: Urine Dipstick-Ancillary                                                    [facility]

                                                                                             

08:39 Order name: EKG; Complete Time: 08:40                                                   [facility] 

                                                                                             

08:39 Order name: Cardiac monitoring; Complete Time: 08:43                                    [facility] 

                                                                                             

08:39 Order name: EKG - Nurse/Tech; Complete Time: 08:43                                      [facility] 

                                                                                             

08:39 Order name: IV Saline Lock; Complete Time: 09:24                                        [facility] 

                                                                                             

08:39 Order name: Labs collected and sent; Complete Time: 09:24                               [facility] 

                                                                                             

08:39 Order name: O2 Per Protocol; Complete Time: 08:43                                       [facility] 

                                                                                             

09:06 Order name: US Extremity Venou Bilateral                                                [facility] 

                                                                                             

08:39 Order name: O2 Sat Monitoring; Complete Time: 08:43                                     [facility] 

                                                                                                  

Administered Medications:                                                                         

09:06 Drug: NS 0.9% 1000 ml Route: IV; Rate: 1 bolus; Site: left forearm;                     tw2 

10:00 Follow up: Response: No adverse reaction; IV Status: Completed infusion; IV Intake:     tw2 

      1000ml                                                                                      

09:12 Drug: Lovenox 1 mg/kg Route: Sub-Q; Site: right lower abdomen;                          tw2 

10:00 Follow up: Response: No adverse reaction                                                tw2 

09:13 Drug: Pepcid 20 mg Route: IVP; Site: left forearm;                                      tw2 

10:00 Follow up: Response: No adverse reaction                                                tw2 

09:16 Drug: Zofran 4 mg Route: IVP; Site: left forearm;                                       tw2 

10:00 Follow up: Response: No adverse reaction; Nausea is decreased                           tw2 

09:18 Drug: morphine 4 mg Route: IVP; Site: left forearm;                                     tw2 

10:00 Follow up: Response: No adverse reaction; Pain is decreased                             tw2 

09:22 Drug: Flagyl 500 mg Volume: 100 ml; Route: IVPB; Rate: 200 ml/hr; Infused Over: 30      tw2 

      mins; Site: left forearm;                                                                   

09:55 Follow up: Response: No adverse reaction; IV Status: Completed infusion                 tw2 

09:23 Drug: Cipro 400 mg Volume: 200 ml; Route: IVPB; Infused Over: 60 mins; Site: left       tw2 

      forearm;                                                                                    

10:25 Follow up: Response: No adverse reaction; IV Status: Completed infusion                 tw2 

10:25 Drug: Magnesium Sulfate 1 grams Route: IVPB; Infused Over: 1 hrs; Site: left forearm;   tw2 

10:25 Follow up: Response: No adverse reaction; IV Status: Completed infusion                 tw2 

11:28 Follow up: Response: No adverse reaction; IV Status: Completed infusion                 tw2 

10:25 Drug: Thiamine 100 mg Route: IV; Rate: bolus; Site: left forearm;                       tw2 

10:27 Follow up: Response: No adverse reaction; IV Status: Completed infusion                 tw2 

                                                                                                  

                                                                                                  

Disposition:                                                                                      

18 10:03 Discharged to Home. Impression: Infectious gastroenteritis and colitis,            

  unspecified.                                                                                    

- Condition is Stable.                                                                            

- Discharge Instructions: Food Choices to Help Relieve Diarrhea, Adult, Clear Liquid              

  Diet, Viral Gastroenteritis, Diarrhea, Easy-to-Read.                                            

                                                                                                  

- Work release form, Medication Reconciliation Form, Thank You Letter, Antibiotic                 

  Education, Prescription Opioid Use form.                                                        

- Follow up: Sheldon Miller MD; When: 1 - 2 days.                                               

- Problem is chronic.                                                                             

- Symptoms have improved.                                                                         

                                                                                                  

                                                                                                  

                                                                                                  

Signatures:                                                                                       

Dispatcher [employer]                           EDReed Galarza MD MD cha Wise, Tara, RN                          RN   tw2                                                  

Urmila Bob MD MD   rp3                                                  

                                                                                                  

Corrections: (The following items were deleted from the chart)                                    

10:01 09:55 Hospitalization Ordered by Urmila Bob MD for Inpatient Admission. Preliminary  markel 

      diagnosis is Abdominal tenderness; Vomiting; Diarrhea, unspecified. Bed requested for       

      Telemetry/MedSurg (Inpatient). Status is Inpatient Admission. Condition is Stable.          

      Problem is new. Symptoms have improved. UTI on Admission? No. markel                           

11:29 10:03 2018 10:03 Discharged to Home. Impression: Infectious gastroenteritis and   tw2 

      colitis, unspecified. Condition is Stable. Forms are Medication Reconciliation Form,        

      Thank You Letter, Antibiotic Education, Prescription Opioid Use. Follow up: Sheldon Miller; When: 1 - 2 days. Problem is chronic. Symptoms have improved. rp3                 

                                                                                                  

**************************************************************************************************

## 2018-05-08 NOTE — RAD REPORT
EXAM DESCRIPTION:  VAS - Extrem Venous W Compress Thang - 5/8/2018 9:36 am

 

CLINICAL HISTORY:  Bilateral leg edema and swelling.

 

COMPARISON:  None.

 

TECHNIQUE:  Real-time sonographic interrogation of the left and right lower extremity deep venous sys
tems was performed.

 

FINDINGS:  Normal compressibility, flow augmentation, phasic flow and spontaneous flow is identified 
in both the left and right lower extremity deep venous systems.

 

IMPRESSION:  No sonographic evidence of left or right lower extremity deep venous thrombosis.

## 2018-05-21 ENCOUNTER — HOSPITAL ENCOUNTER (EMERGENCY)
Dept: HOSPITAL 97 - ER | Age: 58
Discharge: HOME | End: 2018-05-21
Payer: SELF-PAY

## 2018-05-21 VITALS — OXYGEN SATURATION: 98 %

## 2018-05-21 VITALS — SYSTOLIC BLOOD PRESSURE: 174 MMHG | DIASTOLIC BLOOD PRESSURE: 97 MMHG

## 2018-05-21 VITALS — TEMPERATURE: 97.1 F

## 2018-05-21 DIAGNOSIS — I10: ICD-10-CM

## 2018-05-21 DIAGNOSIS — K52.9: Primary | ICD-10-CM

## 2018-05-21 DIAGNOSIS — F17.210: ICD-10-CM

## 2018-05-21 LAB
ALBUMIN SERPL BCP-MCNC: 4.2 G/DL (ref 3.2–5.5)
ALP SERPL-CCNC: 122 IU/L (ref 42–121)
ALT SERPL W P-5'-P-CCNC: 19 IU/L (ref 10–60)
AST SERPL W P-5'-P-CCNC: 25 IU/L (ref 10–42)
BUN BLD-MCNC: 17 MG/DL (ref 6–20)
GLUCOSE SERPLBLD-MCNC: 105 MG/DL (ref 65–120)
HCT VFR BLD CALC: 41 % (ref 39.6–49)
LIPASE SERPL-CCNC: 32 U/L (ref 22–51)
LYMPHOCYTES # SPEC AUTO: 1.7 K/UL (ref 0.7–4.9)
MCH RBC QN AUTO: 30.9 PG (ref 27–35)
MCV RBC: 89.7 FL (ref 80–100)
PMV BLD: 7.5 FL (ref 7.6–11.3)
POTASSIUM SERPL-SCNC: 4 MEQ/L (ref 3.6–5)
RBC # BLD: 4.57 M/UL (ref 4.33–5.43)

## 2018-05-21 PROCEDURE — 36415 COLL VENOUS BLD VENIPUNCTURE: CPT

## 2018-05-21 PROCEDURE — 99284 EMERGENCY DEPT VISIT MOD MDM: CPT

## 2018-05-21 PROCEDURE — 83690 ASSAY OF LIPASE: CPT

## 2018-05-21 PROCEDURE — 85025 COMPLETE CBC W/AUTO DIFF WBC: CPT

## 2018-05-21 PROCEDURE — 96375 TX/PRO/DX INJ NEW DRUG ADDON: CPT

## 2018-05-21 PROCEDURE — 74177 CT ABD & PELVIS W/CONTRAST: CPT

## 2018-05-21 PROCEDURE — 96361 HYDRATE IV INFUSION ADD-ON: CPT

## 2018-05-21 PROCEDURE — 96365 THER/PROPH/DIAG IV INF INIT: CPT

## 2018-05-21 PROCEDURE — 80048 BASIC METABOLIC PNL TOTAL CA: CPT

## 2018-05-21 PROCEDURE — 96368 THER/DIAG CONCURRENT INF: CPT

## 2018-05-21 PROCEDURE — 80076 HEPATIC FUNCTION PANEL: CPT

## 2018-05-21 NOTE — RAD REPORT
EXAM DESCRIPTION:  CT - Abdomen   Pelvis W Contrast - 5/21/2018 11:24 am

 

CLINICAL HISTORY:  Abdominal pain, nausea and vomiting, prior appendectomy and cholecystectomy

 

COMPARISON:  CT April 17

 

TECHNIQUE:  Biphasic, helical CT imaging of the abdomen and pelvis was performed following 100 ml non
-ionic IV contrast. Oral contrast was given.

 

All CT scans are performed using dose optimization technique as appropriate and may include automated
 exposure control or mA/KV adjustment according to patient size.

 

FINDINGS:  No suspicious findings in the lung bases.

 

The liver, spleen, and pancreas show no suspicious findings. Cholecystectomy clips are present. No bi
liary tree dilatation.  Mild fatty infiltration of the liver is present appearing less pronounced keira
n the April study.

 

Symmetric renal function is seen with no hydronephrosis or suspicious renal mass. No pyelonephritis o
r acute renal parenchymal process. Urinary bladder, prostate and seminal vesicles within normal limit
s.

 

No gastric dilatation or gastric wall thickening. Oral contrast has reached the left-side of the ayon
sverse colon. No dilated small bowel loop or focal small bowel abnormality. There is questionable min
imal wall thickening at the terminal ileum. From cecum to the hepatic flexure there is a mild wall th
ickening and edema. This nonspecific mild colitis pattern is not substantially different from the com
parison. No significant left side colon involvement. No significant atherosclerotic change at the mes
enteric artery origins. Ischemia is not suspected.

 

  No free air, free fluid or pneumatosis. No significant inflammatory stranding of the right side col
on nor elsewhere.  No mass or bulky lymphadenopathy. Small fat only left inguinal hernia. No adrenal 
abnormality.

 

No suspicious bony findings.

 

 

IMPRESSION:  Mild right-sided colon nonspecific colitis pattern. No mass, abscess or other complicati
ng factor.

 

Colitis pattern is not substantially different from April.

## 2018-05-21 NOTE — EDPHYS
Physician Documentation                                                                           

 [facility]                                                                

Name: Nitin Ricci                                                                             

Age: 57 yrs                                                                                       

Sex: Male                                                                                         

: 1960                                                                                   

MRN: [id number]                                                                                   

Arrival Date: 2018                                                                          

Time: 08:47                                                                                       

Account#: [id number]                                                                            

Bed 16                                                                                            

Private MD:                                                                                       

ED Physician Rudolph Mazariegos                                                                         

HPI:                                                                                              

                                                                                             

09:33 This 57 yrs old  Male presents to ER via Ambulatory with complaints of         rn  

      Abdominal Pain, Headache.                                                                   

09:34 The patient presents with abdominal pain that is diffuse. Onset: The symptoms/episode   rn  

      began/occurred yesterday. The symptoms do not radiate. Associated signs and symptoms:       

      Pertinent positives: nausea and vomiting, diarrhea, Pertinent negatives: fever,             

      hematuria, testicular pain, vomiting blood. Severity of pain: At its worst the pain was     

      mild in the emergency department the pain is unchanged. The patient has experienced         

      similar episodes in the past. The patient has not recently seen a physician.                

                                                                                                  

Historical:                                                                                       

- Allergies:                                                                                      

08:54 No Known Drug Allergies;                                                                lk1 

- PMHx:                                                                                           

08:54 Hypertension;                                                                           lk1 

- PSHx:                                                                                           

08:54 Appendectomy; Cholecystectomy;                                                          lk1 

                                                                                                  

- Immunization history:: Adult Immunizations up to date.                                          

- Social history:: Smoking status: Patient uses tobacco products, smokes one-half pack            

  cigarettes per day, Patient/guardian denies using alcohol.                                      

- Family history:: not pertinent.                                                                 

- Hospitalizations: : No recent hospitalization is reported.                                      

                                                                                                  

                                                                                                  

ROS:                                                                                              

09:34 Constitutional: Negative for fever, chills, and weight loss, Eyes: Negative for injury, rn  

      pain, redness, and discharge, Cardiovascular: Negative for chest pain, palpitations,        

      and edema, Respiratory: Negative for shortness of breath, cough, wheezing, and              

      pleuritic chest pain, Abdomen/GI: + abd pain/nausea/vomiting/diarrhea MS/Extremity:         

      Negative for injury and deformity, Skin: Negative for injury, rash, and discoloration,      

      Neuro: Negative for numbness, tingling, and seizure.                                        

                                                                                                  

Exam:                                                                                             

09:34 Constitutional:  This is a well developed, well nourished patient who is awake, alert,  rn  

      and in no acute distress. Head/Face:  Normocephalic, atraumatic. Eyes:  Pupils equal        

      round and reactive to light, extra-ocular motions intact.  Lids and lashes normal.          

      Conjunctiva and sclera are non-icteric and not injected.  Cornea within normal limits.      

      Periorbital areas with no swelling, redness, or edema. Cardiovascular:  Regular rate        

      and rhythm with a normal S1 and S2.  No gallops, murmurs, or rubs.  Normal PMI, no JVD.     

       No pulse deficits. Respiratory:  Lungs have equal breath sounds bilaterally, clear to      

      auscultation and percussion.  No rales, rhonchi or wheezes noted.  No increased work of     

      breathing, no retractions or nasal flaring. Abdomen/GI:  soft, mild tenderness in all       

      quadrants, no rebound MS/ Extremity:  Pulses equal, no cyanosis.  Neurovascular intact.     

       Full, normal range of motion.  Equal circumference. Neuro:  Awake and alert, GCS 15,       

      oriented to person, place, time, and situation.  Cranial nerves II-XII grossly intact.      

      Motor strength 5/5 in all extremities.  Sensory grossly intact.                             

                                                                                                  

Vital Signs:                                                                                      

08:55  / 93; Pulse 109; Resp 15; Temp 97.1(TE); Pulse Ox 97% ; Weight 90.72 kg (R);     lk1 

      Height 6 ft. 3 in. (190.50 cm) (R); Pain 9/10;                                              

09:51  / 91; Pulse 92; Resp 16; Pulse Ox 98% ;                                          jl7 

11:37  / 97; Pulse 88; Resp 16; Pulse Ox 98% ;                                          jl7 

08:55 Body Mass Index 25.00 (90.72 kg, 190.50 cm)                                             lk1 

                                                                                                  

MDM:                                                                                              

08:58 Patient medically screened.                                                             rn  

11:52 Differential diagnosis: bowel obstruction, diverticulitis, gastritis, non-specific abd  rn  

      pain, pancreatitis, Peptic Ulcer Disease. Data reviewed: vital signs, nurses notes, lab     

      test result(s), EKG, radiologic studies, CT scan, and as a result, I will discharge         

      patient. Counseling: I had a detailed discussion with the patient and/or guardian           

      regarding: the historical points, exam findings, and any diagnostic results supporting      

      the discharge/admit diagnosis, lab results, radiology results, the need for outpatient      

      follow up, to return to the emergency department if symptoms worsen or persist or if        

      there are any questions or concerns that arise at home. Response to treatment: the          

      patient's symptoms have mildly improved after treatment, and as a result, I will            

      discharge patient. Special discussion: Based on the patient's Hx, exam, and Dx              

      evaluation, there is no indication for emergent surgery or inpatient Tx. It is              

      understood by the patient/guardian that if the Sx's persist or worsen they need to          

      return immediately for re-evaluation. I discussed with the patient/guardian in detail       

      that at this point there is no indication for admission to the hospital. It is              

      understood, however, that if the symptoms persist or worsen the patient needs to return     

      immediately for re-evaluation. Based on the presenting symptoms and work-up in the          

      emergency department, I discussed in detail the need to arrange with the PCP or             

      specialist an outpatient procedure, colonoscopy by the GI specialist, Based on the          

      history and exam findings, there is no indication for further emergent testing or           

      inpatient evaluation. I discussed with the patient/guardian the need to see the             

      gastroenterologist for further evaluation of the symptoms.                                  

                                                                                                  

                                                                                             

09:07 Order name: Basic Metabolic Panel; Complete Time: 10:15                                 rn  

                                                                                             

09:07 Order name: CBC with Diff; Complete Time: 10:15                                         rn  

                                                                                             

09:07 Order name: Hepatic Function; Complete Time: 10:15                                      rn  

                                                                                             

09:07 Order name: Lipase; Complete Time: 10:15                                                rn  

                                                                                             

09:07 Order name: CT Abd/Pelvis - W/Contrast; Complete Time: 11:37                            rn  

                                                                                             

09:07 Order name: IV Saline Lock; Complete Time: 09:39                                        rn  

                                                                                             

09:07 Order name: Labs collected and sent; Complete Time: 09:39                               rn  

                                                                                                  

Administered Medications:                                                                         

09:37 Drug: NS 0.9% 1000 ml Route: IV; Rate: 1000 ml; Site: right antecubital;                jl7 

10:40 Follow up: IV Status: Completed infusion                                                jl7 

09:38 Drug: Zofran 4 mg Route: IVP; Site: right antecubital;                                  jl7 

10:30 Follow up: Response: No adverse reaction; Nausea is decreased                           jl7 

12:08 Drug: Cipro 400 mg Volume: 200 ml; Route: IVPB; Infused Over: 60 mins; Site: right      jl7 

      antecubital;                                                                                

13:10 Follow up: IV Status: Completed infusion                                                jl7 

12:08 Drug: Flagyl 500 mg Volume: 100 ml; Route: IVPB; Rate: 200 ml/hr; Infused Over: 30      jl7 

      mins; Site: right antecubital;                                                              

12:40 Follow up: IV Status: Completed infusion                                                jl7 

                                                                                                  

                                                                                                  

Disposition:                                                                                      

18 11:54 Discharged to Home. Impression: Colitis.                                           

- Condition is Stable.                                                                            

- Discharge Instructions: Diarrhea, Nausea and Vomiting.                                          

- Prescriptions for Zofran ODT 4 mg Oral tablet,disintegrating - place 1 tablet by                

  TRANSLINGUAL route every 8-10 hours As needed; 20 tablet. Cipro 500 mg Oral Tablet -            

  take 1 tablet by ORAL route every 12 hours for 10 days; 20 tablet. Flagyl 500 mg Oral           

  Tablet - take 1 tablet by ORAL route every 8 hours for 10 days; 30 tablet. Tylenol-             

  Codeine #3 300-30 mg Oral Tablet - take 1 tablet by ORAL route every 6 hours As                 

  needed; 20 tablet.                                                                              

- Medication Reconciliation Form, Thank You Letter, Antibiotic Education, Prescription            

  Opioid Use form.                                                                                

- Follow up: Williams Sheridan MD; When: As needed; Reason: Recheck today's complaints,                

  Re-evaluation by your physician.                                                                

- Problem is new.                                                                                 

- Symptoms have improved.                                                                         

                                                                                                  

                                                                                                  

                                                                                                  

Signatures:                                                                                       

Dispatcher [employer]                           EDMS                                                 

Rudolph Mazariegos MD MD rn Kluge, Leah, RN                         RN   lk1                                                  

Allison Chapman RN                        RN   jl7                                                  

                                                                                                  

Corrections: (The following items were deleted from the chart)                                    

13:34 11:54 2018 11:54 Discharged to Home. Impression: Colitis. Condition is Stable.    jl7 

      Forms are Medication Reconciliation Form, Thank You Letter, Antibiotic Education,           

      Prescription Opioid Use. Follow up: Williams Sheridan; When: As needed; Reason: Recheck            

      today's complaints, Re-evaluation by your physician. Problem is new. Symptoms have          

      improved. rn                                                                                

                                                                                                  

**************************************************************************************************

## 2018-05-21 NOTE — ER
Nurse's Notes                                                                                     

 [facility] Emergency Department                                                                

Name: Nitin Ricci                                                                             

Age: 57 yrs                                                                                       

Sex: Male                                                                                         

: 1960                                                                                   

MRN: [id number]                                                                                   

Arrival Date: 2018                                                                          

Time: 08:47                                                                                       

Account#: [id number]                                                                            

Bed 16                                                                                            

Private MD:                                                                                       

Diagnosis: Colitis                                                                                

                                                                                                  

Presentation:                                                                                     

                                                                                             

08:53 Presenting complaint: Patient states: "My stomach is hurting again. I have been here a  lk1 

      couple times and I need a colonoscopy. My intestines are swollen up shut.". Transition      

      of care: patient was not received from another setting of care. Onset of symptoms was       

      May 20, 2018 at 19:00. Initial Sepsis Screen: Does the patient meet any 2 criteria? No.     

      Patient's initial sepsis screen is negative. Does the patient have a suspected source       

      of infection? No. Patient's initial sepsis screen is negative. Care prior to arrival:       

      None.                                                                                       

08:53 Method Of Arrival: Ambulatory                                                           lk1 

08:53 Acuity: PREETI 3                                                                           lk1 

                                                                                                  

Triage Assessment:                                                                                

08:55 General: Appears in no apparent distress. Behavior is calm, cooperative, appropriate    lk1 

      for age. General: Smells of alcohol. Pain: Complains of pain in abdomen Pain currently      

      is 9 out of 10 on a pain scale. GI: Abdomen is non-distended, Patient currently denies      

      vomiting, Parent/caregiver reports the patient having diarrhea, nausea.                     

                                                                                                  

Historical:                                                                                       

- Allergies:                                                                                      

08:54 No Known Drug Allergies;                                                                lk1 

- PMHx:                                                                                           

08:54 Hypertension;                                                                           lk1 

- PSHx:                                                                                           

08:54 Appendectomy; Cholecystectomy;                                                          lk1 

                                                                                                  

- Immunization history:: Adult Immunizations up to date.                                          

- Social history:: Smoking status: Patient uses tobacco products, smokes one-half pack            

  cigarettes per day, Patient/guardian denies using alcohol.                                      

- Family history:: not pertinent.                                                                 

- Hospitalizations: : No recent hospitalization is reported.                                      

                                                                                                  

                                                                                                  

Screenin:09 Abuse screen: Denies threats or abuse. Denies injuries from another. Nutritional        jl7 

      screening: No deficits noted. Tuberculosis screening: No symptoms or risk factors           

      identified. Fall Risk IV access (20 points). Total Dougherty Fall Scale indicates No Risk       

      (0-24 pts).                                                                                 

                                                                                                  

Assessment:                                                                                       

09:09 General: Appears in no apparent distress. Behavior is calm, cooperative, appropriate    jl7 

      for age. Pain: Complains of pain in abdomen Pain does not radiate. Pain currently is 9      

      out of 10 on a pain scale. Quality of pain is described as throbbing, Pain began 1 day      

      ago. Is continuous. Neuro: Level of Consciousness is awake, alert, obeys commands,          

      Oriented to person, place, time, situation. Cardiovascular: Patient's skin is warm and      

      dry. Respiratory: Airway is patent Respiratory effort is even, unlabored, Respiratory       

      pattern is regular, symmetrical. GI: Bowel sounds present X 4 quads. hypoactive in          

      right upper quadrant, left upper quadrant and left lower quadrant Abd is soft X 4 quads     

      Abd is non tender in left upper quadrant and left lower quadrant Abdomen is tender to       

      palpation in right upper quadrant and right lower quadrant Reports diarrhea, nausea,        

      Patient currently denies vomiting. Derm: Skin is pink, warm \T\ dry.                        

10:00 Reassessment: No changes from previously documented assessment. Patient and/or family   jl7 

      updated on plan of care and expected duration. Pain level reassessed. Patient is alert,     

      oriented x 3, equal unlabored respirations, skin warm/dry/pink.                             

11:30 Reassessment: Patient appears in no apparent distress at this time. Patient and/or      jl7 

      family updated on plan of care and expected duration. Pain level reassessed. Patient is     

      alert, oriented x 3, equal unlabored respirations, skin warm/dry/pink. Pt back from CT.     

12:10 Reassessment: Awaiting medication infusion to complete before discharge.                jl7 

                                                                                                  

Vital Signs:                                                                                      

08:55  / 93; Pulse 109; Resp 15; Temp 97.1(TE); Pulse Ox 97% ; Weight 90.72 kg (R);     lk1 

      Height 6 ft. 3 in. (190.50 cm) (R); Pain 9/10;                                              

09:51  / 91; Pulse 92; Resp 16; Pulse Ox 98% ;                                          jl7 

11:37  / 97; Pulse 88; Resp 16; Pulse Ox 98% ;                                          jl7 

08:55 Body Mass Index 25.00 (90.72 kg, 190.50 cm)                                             lk1 

                                                                                                  

ED Course:                                                                                        

08:47 Patient arrived in ED.                                                                  rg4 

08:54 Triage completed.                                                                       lk1 

08:57 Arm band placed on left wrist.                                                          lk1 

08:58 Rudolph Mazariegos MD is Attending Physician.                                                rn  

09:09 Allison Chapman RN is Primary Nurse.                                                      jl7 

09:09 Patient has correct armband on for positive identification. Bed in low position. Call   jl7 

      light in reach. Side rails up X 1. Pulse ox on. NIBP on.                                    

09:32 Initial lab(s) drawn, sent to lab. Inserted saline lock: 20 gauge in right antecubital  jl7 

      area, using aseptic technique. ,using aseptic technique. inserted by [employer] EMT student     

      Blood collected.                                                                            

11:13 Patient moved to CT via wheelchair.                                                     kw1 

11:22 CT completed. Patient tolerated procedure well. Patient moved back from CT.             kw1 

11:24 CT Abd/Pelvis - W/Contrast In Process Unspecified.                                      EDMS

11:54 Williams Sheridan MD is Referral Physician.                                                  rn  

13:23 No provider procedures requiring assistance completed. IV discontinued, intact,         jl7 

      bleeding controlled, No redness/swelling at site. Pressure dressing applied.                

                                                                                                  

Administered Medications:                                                                         

09:37 Drug: NS 0.9% 1000 ml Route: IV; Rate: 1000 ml; Site: right antecubital;                jl7 

10:40 Follow up: IV Status: Completed infusion                                                jl7 

09:38 Drug: Zofran 4 mg Route: IVP; Site: right antecubital;                                  jl7 

10:30 Follow up: Response: No adverse reaction; Nausea is decreased                           jl7 

12:08 Drug: Cipro 400 mg Volume: 200 ml; Route: IVPB; Infused Over: 60 mins; Site: right      jl7 

      antecubital;                                                                                

13:10 Follow up: IV Status: Completed infusion                                                jl7 

12:08 Drug: Flagyl 500 mg Volume: 100 ml; Route: IVPB; Rate: 200 ml/hr; Infused Over: 30      jl7 

      mins; Site: right antecubital;                                                              

12:40 Follow up: IV Status: Completed infusion                                                jl7 

                                                                                                  

                                                                                                  

Outcome:                                                                                          

11:54 Discharge ordered by MD.                                                                rn  

13:23 Discharged to home ambulatory.                                                          jl7 

13:23 Condition: stable                                                                           

13:23 Discharge instructions given to patient, Instructed on discharge instructions, follow       

      up and referral plans. medication usage, Demonstrated understanding of instructions,        

      follow-up care, medications, Prescriptions given X 4.                                       

13:34 Patient left the ED.                                                                    jl7 

                                                                                                  

Signatures:                                                                                       

Dispatcher [employer]                           EDMS                                                 

Rudolph Mazariegos MD MD rn Kluge, Leah, RN                         RN   lk1                                                  

Melodie Sherman                                 rg4                                                  

Allison Chapman RN                        RN   jl7                                                  

Karma Irvin                            kw1                                                  

                                                                                                  

**************************************************************************************************

## 2018-06-05 ENCOUNTER — HOSPITAL ENCOUNTER (OUTPATIENT)
Dept: HOSPITAL 97 - ER | Age: 58
Setting detail: OBSERVATION
LOS: 1 days | Discharge: HOME | End: 2018-06-06
Attending: FAMILY MEDICINE | Admitting: FAMILY MEDICINE
Payer: SELF-PAY

## 2018-06-05 VITALS — OXYGEN SATURATION: 96 %

## 2018-06-05 VITALS — BODY MASS INDEX: 25 KG/M2

## 2018-06-05 DIAGNOSIS — I10: ICD-10-CM

## 2018-06-05 DIAGNOSIS — R11.2: Primary | ICD-10-CM

## 2018-06-05 DIAGNOSIS — K74.60: ICD-10-CM

## 2018-06-05 DIAGNOSIS — R10.9: ICD-10-CM

## 2018-06-05 LAB
ALBUMIN SERPL BCP-MCNC: 3.7 G/DL (ref 3.2–5.5)
ALP SERPL-CCNC: 139 IU/L (ref 42–121)
ALT SERPL W P-5'-P-CCNC: 101 IU/L (ref 10–60)
AST SERPL W P-5'-P-CCNC: 97 IU/L (ref 10–42)
BUN BLD-MCNC: 18 MG/DL (ref 6–20)
GLUCOSE SERPLBLD-MCNC: 118 MG/DL (ref 65–120)
HCT VFR BLD CALC: 43.5 % (ref 39.6–49)
LIPASE SERPL-CCNC: 41 U/L (ref 22–51)
LYMPHOCYTES # SPEC AUTO: 1.3 K/UL (ref 0.7–4.9)
MCH RBC QN AUTO: 30.9 PG (ref 27–35)
MCV RBC: 90.7 FL (ref 80–100)
PMV BLD: 7.6 FL (ref 7.6–11.3)
POTASSIUM SERPL-SCNC: 4.3 MEQ/L (ref 3.6–5)
RBC # BLD: 4.8 M/UL (ref 4.33–5.43)
UA COMPLETE W REFLEX CULTURE PNL UR: (no result)

## 2018-06-05 PROCEDURE — 83690 ASSAY OF LIPASE: CPT

## 2018-06-05 PROCEDURE — 96374 THER/PROPH/DIAG INJ IV PUSH: CPT

## 2018-06-05 PROCEDURE — 81003 URINALYSIS AUTO W/O SCOPE: CPT

## 2018-06-05 PROCEDURE — 80076 HEPATIC FUNCTION PANEL: CPT

## 2018-06-05 PROCEDURE — 99285 EMERGENCY DEPT VISIT HI MDM: CPT

## 2018-06-05 PROCEDURE — 83735 ASSAY OF MAGNESIUM: CPT

## 2018-06-05 PROCEDURE — 80048 BASIC METABOLIC PNL TOTAL CA: CPT

## 2018-06-05 PROCEDURE — 84100 ASSAY OF PHOSPHORUS: CPT

## 2018-06-05 PROCEDURE — 81015 MICROSCOPIC EXAM OF URINE: CPT

## 2018-06-05 PROCEDURE — 80053 COMPREHEN METABOLIC PANEL: CPT

## 2018-06-05 PROCEDURE — 85025 COMPLETE CBC W/AUTO DIFF WBC: CPT

## 2018-06-05 PROCEDURE — 36415 COLL VENOUS BLD VENIPUNCTURE: CPT

## 2018-06-05 PROCEDURE — 74250 X-RAY XM SM INT 1CNTRST STD: CPT

## 2018-06-05 RX ADMIN — CIPROFLOXACIN SCH MLS: 2 INJECTION, SOLUTION INTRAVENOUS at 21:26

## 2018-06-05 RX ADMIN — METRONIDAZOLE SCH MLS: 500 INJECTION, SOLUTION INTRAVENOUS at 17:15

## 2018-06-05 RX ADMIN — Medication SCH ML: at 21:26

## 2018-06-05 NOTE — P.HP
Certification for Inpatient


Patient admitted to: Observation


With expected LOS: <2 Midnights


Patient will require the following post-hospital care: None


Practitioner: I am a practitioner with admitting privileges, knowledge of 

patient current condition, hospital course, and medical plan of care.


Services: Services provided to patient in accordance with Admission 

requirements found in Title 42 Section 412.3 of the Code of Federal Regulations





Patient History


Date of Service: 18


Primary Care Provider: OOT


Reason for admission: Abd Pain, Intractable N/V 


History of Present Illness: 





57-year-old male with significant past medical history of liver cirrhosis who 

presented to the ED complaining of having some right-sided abdominal pain that 

has gotten progressively worse.  Patient has had repeated admissions here in 

the hospital for the similar complaints.  Patient has a history of liver 

cirrhosis due to which she has chronic electrolyte abnormality along with 

colitis.  Patient however states that he has been having nausea and vomiting 

and has not been able to keep anything down.  Patient was admitted 2 weeks ago 

for the similar complaints was started on Cipro and Flagyl and was then 

discharged home with oral antibiotics and follow up with GI.  Patient however 

states that has not been able to get in to GI for a colonoscopy and is 

concerned that he might have a colon tumor that has been undiagnosed and this 

needed to get it worked out.  Patient states that he was waiting for a call 

from the GI doctors office however his right-sided abdominal pain got worse and 

his nausea vomiting worsened as a decided to come to the ER for further 

treatment.  Patient denies having any fever chills, chest pain or shortness of 

breath at home.


Allergies





No Known Drug Allergies Allergy (Mild, Verified 18 20:57)


 Unknown





Home Medications: 








Ciprofloxacin HCl [Cipro 500 MG Tablet] 500 mg PO DAILY #10 tab 18 


Lisinopril [Prinivil*] 10 mg PO DAILY #30 tab 18 


Metronidazole [Flagyl] 500 mg PO Q8H #30 tablet 18 








- Past Medical/Surgical History


Diabetic: No


-: HTN


-: ETOH ABUSE


-: paralytic ileus


-: cholecystectomy


-: appendectomy





- Family History


  ** Father


-: Cancer


Notes:  of Colon Cancer





  ** Mother


-: Liver disease


Notes: Hepatitis because of Blood Transfusion as stated by pt





- Social History


Alcohol use: Yes


CD- Drugs: No


Caffeine use: Yes





Review of Systems


General: As per HPI





Physical Examination





- Physical Exam


General: Alert, In no apparent distress, Oriented x3


HEENT: Atraumatic


Neck: Supple, 2+ carotid pulse no bruit, No LAD, Without JVD or thyroid 

abnormality


Respiratory: Clear to auscultation bilaterally, Normal air movement


Cardiovascular: Regular rate/rhythm, Normal S1 S2


Gastrointestinal: Normal bowel sounds, Tenderness (Generalized Tenderness and 

RLQ more than other)


Musculoskeletal: No tenderness


Integumentary: No rashes


Neurological: Normal speech, Normal strength at 5/5 x4 extr, Normal tone


Lymphatics: No axilla or inguinal lymphadenopathy





- Studies


Laboratory Data (last 24 hrs)





18 09:40: Creatinine 0.97


18 09:40: WBC 3.9 L, Hgb 14.8, Hct 43.5, Plt Count 306


18 09:40: Sodium 136, Potassium 4.3, BUN 18, Creatinine 0.98, Glucose 118

, Total Bilirubin 1.0, AST 97 H,  H, Alkaline Phosphatase 139 H, Lipase 

41








Assessment and Plan





- Problems (Diagnosis)


(1) Intractable nausea and vomiting


Current Visit: Yes   Status: Acute   


Plan: 


Intractable N/V and Abd pain. most Likely colitis. 


-IV Zofran 


-replace Electrolytes as needed 


-GI consulted. awaiting reccs 





Qualifiers: 


   Vomiting type: cyclical vomiting   Qualified Code(s): G43.A1 - Cyclical 

vomiting, intractable   





(2) Abdominal pain


Current Visit: No   Status: Acute   


Plan: 


Intractable N/V and RLQ Abd pain. most Likely colitis. CT scan Recently on  

with colitis. Pt also has diarrhea 


-IV cipro and flagy. 


-No diarrhea noted here 


-GI consulted for possible procedure. 


-NPO for now 


 


Qualifiers: 


   Abdominal location: right lower quadrant   Qualified Code(s): R10.31 - Right 

lower quadrant pain   





(3) Liver cirrhosis


Current Visit: Yes   Status: Chronic   


Qualifiers: 


   Hepatic cirrhosis type: alcoholic cirrhosis   Ascites presence: without 

ascites   Qualified Code(s): K70.30 - Alcoholic cirrhosis of liver without 

ascites   





(4) HTN (hypertension)


Onset Date: 18   Current Visit: No   Status: Chronic   


Qualifiers: 


   Hypertension type: essential hypertension 





- Advance Directives


Does patient have a Living Will: No


Does patient have a Durable POA for Healthcare: No

## 2018-06-05 NOTE — ER
Nurse's Notes                                                                                     

 [facility]                                                                

Name: Nitin Ricci                                                                             

Age: 57 yrs                                                                                       

Sex: Male                                                                                         

: 1960                                                                                   

MRN: [id number]                                                                                   

Arrival Date: 2018                                                                          

Time: 08:45                                                                                       

Account#: [id number]                                                                            

Bed 23                                                                                            

Private MD: Unknown, Unknown                                                                      

Diagnosis: Vomiting;Diarrhea, unspecified;Colitis                                                 

                                                                                                  

Presentation:                                                                                     

                                                                                             

08:52 Presenting complaint: Patient states: generalized abd pain with N/V/D. Pt states "I was aa5 

      here 2 weeks ago and they said that I probably have a colon tumor and I need a              

      colonoscopy". Transition of care: patient was not received from another setting of          

      care. Onset of symptoms was . Risk Assessment: Do you want to hurt yourself or          

      someone else? Patient reports no desire to harm self or others. Initial Sepsis Screen:      

      Does the patient meet any 2 criteria? No. Patient's initial sepsis screen is negative.      

      Does the patient have a suspected source of infection? No. Patient's initial sepsis         

      screen is negative. Care prior to arrival: None.                                            

08:52 Method Of Arrival: Ambulatory                                                           aa5 

08:52 Acuity: PREETI 3                                                                           aa5 

                                                                                                  

Historical:                                                                                       

- Allergies:                                                                                      

08:53 No Known Allergies;                                                                     aa5 

- PMHx:                                                                                           

08:53 Hypertension;                                                                           aa5 

- PSHx:                                                                                           

08:53 Appendectomy; Cholecystectomy;                                                          aa5 

                                                                                                  

- Immunization history:: Adult Immunizations not up to date.                                      

- Social history:: Smoking status: Patient uses tobacco products, smokes one-half pack            

  cigarettes per day.                                                                             

- Ebola Screening: : No symptoms or risks identified at this time.                                

- Family history:: not pertinent.                                                                 

- Hospitalizations: : No recent hospitalization is reported.                                      

                                                                                                  

                                                                                                  

Screenin:59 Abuse screen: Denies threats or abuse. Denies injuries from another. Nutritional        aj1 

      screening: No deficits noted. Tuberculosis screening: No symptoms or risk factors           

      identified.                                                                                 

14:42 Fall Risk No fall in past 12 months (0 pts). No secondary diagnosis (0 pts). IV access  aj1 

      (20 points). Ambulatory Aid- None/Bed Rest/Nurse Assist (0 pts). Gait- Normal/Bed           

      Rest/Wheelchair (0 pts) Mental Status- Oriented to own ability (0 pts). Total Dougherty         

      Fall Scale indicates No Risk (0-24 pts).                                                    

                                                                                                  

Assessment:                                                                                       

08:59 General: Appears in no apparent distress. uncomfortable, Behavior is calm, cooperative, aj1 

      appropriate for age. Pain: Complains of pain in abdomen diffusely Pain does not             

      radiate. Pain currently is 10 out of 10 on a pain scale. Quality of pain is described       

      as "like I'm being kicked in the stomach" Pain began 4 days ago Is continuous,              

      Alleviated by rest, Aggravated by movement. Neuro: Level of Consciousness is awake,         

      alert, obeys commands, Oriented to person, place, time, situation. Cardiovascular:          

      Patient's skin is warm and dry. Respiratory: Airway is patent Respiratory effort is         

      even, unlabored, Respiratory pattern is regular, symmetrical. GI: Abdomen is                

      non-distended, Bowel sounds present X 4 quads. Abd is soft X 4 quads Abdomen is tender      

      to palpation in abdomen diffusely Reports diarrhea, nausea, vomiting. : No signs          

      and/or symptoms were reported regarding the genitourinary system. EENT: No signs and/or     

      symptoms were reported regarding the EENT system. Derm: No signs and/or symptoms            

      reported regarding the dermatologic system. Skin is pink, warm \T\ dry. normal.             

      Musculoskeletal: No signs and/or symptoms reported regarding the musculoskeletal            

      system. Circulation, motion, and sensation intact.                                          

10:00 Reassessment: Patient appears in no apparent distress at this time. No changes from     aj1 

      previously documented assessment. Patient and/or family updated on plan of care and         

      expected duration. Pain level reassessed. Patient is alert, oriented x 3, equal             

      unlabored respirations, skin warm/dry/pink.                                                 

10:47 Reassessment: Patient appears in no apparent distress at this time. No changes from     aj1 

      previously documented assessment. Patient and/or family updated on plan of care and         

      expected duration. Pain level reassessed. Patient is alert, oriented x 3, equal             

      unlabored respirations, skin warm/dry/pink.                                                 

11:30 Reassessment: Patient appears in no apparent distress at this time. No changes from     aj1 

      previously documented assessment. Patient and/or family updated on plan of care and         

      expected duration. Pain level reassessed. Patient is alert, oriented x 3, equal             

      unlabored respirations, skin warm/dry/pink.                                                 

12:36 Reassessment: Patient appears in no apparent distress at this time. No changes from     aj1 

      previously documented assessment. Patient and/or family updated on plan of care and         

      expected duration. Pain level reassessed. Patient is alert, oriented x 3, equal             

      unlabored respirations, skin warm/dry/pink.                                                 

13:33 Reassessment: Attempted to call report, spoke with Jamila on 4th floor who states this   aj1 

      patient has not been assigned to a nurse at this time.                                      

14:42 Reassessment: Patient appears in no apparent distress at this time. No changes from     aj1 

      previously documented assessment. Patient and/or family updated on plan of care and         

      expected duration. Pain level reassessed. Patient is alert, oriented x 3, equal             

      unlabored respirations, skin warm/dry/pink.                                                 

                                                                                                  

Vital Signs:                                                                                      

08:53  / 88; Pulse 100; Resp 18 S; Temp 98.2(TE); Pulse Ox 96% on R/A; Weight 90.72 kg  aa5 

      (R); Height 6 ft. 3 in. (190.50 cm) (R); Pain 10/10;                                        

10:47  / 94; Pulse 87; Resp 18; Pulse Ox 99% ;                                          aj1 

12:36  / 62; Pulse 92; Resp 18; Pulse Ox 99% ;                                          aj1 

14:47  / 95; Pulse 85; Resp 18; Pulse Ox 99% ;                                          aj1 

08:53 Body Mass Index 25.00 (90.72 kg, 190.50 cm)                                             aa5 

                                                                                                  

ED Course:                                                                                        

08:45 Patient arrived in ED.                                                                  jb7 

08:46 Unknown, Unknown is Private Physician.                                                  jb7 

08:53 Triage completed.                                                                       aa5 

08:53 Arm band placed on.                                                                     aa5 

08:55 Kalpana Harrison, RN is Primary Nurse.                                                   aj1 

08:56 Rudolph Mazariegos MD is Attending Physician.                                                rn  

08:59 Patient has correct armband on for positive identification. Bed in low position. Call   aj1 

      light in reach. Side rails up X 1.                                                          

08:59 No provider procedures requiring assistance completed.                                  aj1 

09:35 Initial lab(s) drawn, by me, sent to lab. Inserted saline lock: 20 gauge in right       aj1 

      antecubital area, using aseptic technique. Blood collected.                                 

11:21 Urmila Bob MD is Hospitalizing Provider.                                           rn  

14:43 Patient admitted, IV remains in place.                                                  aj1 

                                                                                                  

Administered Medications:                                                                         

12:18 Drug: Zofran 4 mg Route: IVP; Site: right antecubital;                                  aj1 

14:48 Follow up: Response: No adverse reaction                                                aj1 

                                                                                                  

                                                                                                  

Outcome:                                                                                          

11:21 Decision to Hospitalize by Provider.                                                    rn  

14:48 Admitted to Med/surg accompanied by tech, via wheelchair, with chart.                   aj1 

14:48 Condition: improved                                                                         

14:48 Discharge instructions given to patient, Instructed on the need for admit, Demonstrated     

      understanding of instructions.                                                              

14:52 Patient left the ED.                                                                    aj1 

                                                                                                  

Signatures:                                                                                       

Kalpana Harrison RN                     RN   aj1                                                  

Rudolph Mazariegos MD MD rn Calderon, Audri, RN                     RN   aa5                                                  

Aba King                                jb7                                                  

                                                                                                  

**************************************************************************************************

## 2018-06-05 NOTE — RAD REPORT
EXAM DESCRIPTION:  RAD - Small Bowel Series - 6/5/2018 8:22 pm

 

CLINICAL HISTORY:  Abdominal pain/

 

COMPARISON:  None.

 

FINDINGS:  Contrast enters the colon by approximately 1 hour .

 

The mucosal folds of the proximal jejunum appear mildly thickened appear

 

No permanent filling defects, obstructing or constricting lesions are seen.

 

The small bowel caliber is normal.

 

IMPRESSION:  Mild thickening of the mucosal folds of the proximal jejunum may indicate inflammation.

## 2018-06-05 NOTE — EDPHYS
Physician Documentation                                                                           

 [facility]                                                                

Name: Nitin Ricci                                                                             

Age: 57 yrs                                                                                       

Sex: Male                                                                                         

: 1960                                                                                   

MRN: [id number]                                                                                   

Arrival Date: 2018                                                                          

Time: 08:45                                                                                       

Account#: [id number]                                                                            

Bed 23                                                                                            

Private MD: Unknown, Unknown                                                                      

ED Physician Rudolph Mazariegos                                                                         

HPI:                                                                                              

                                                                                             

10:52 This 57 yrs old  Male presents to ER via Ambulatory with complaints of         rn  

      Nausea/Vomiting/Diarrhea, Abdominal Pain.                                                   

10:52 The patient presents to the emergency department with nausea, vomiting, diarrhea,       rn  

      abdominal pain.                                                                             

                                                                                                  

Historical:                                                                                       

- Allergies:                                                                                      

08:53 No Known Allergies;                                                                     aa5 

- PMHx:                                                                                           

08:53 Hypertension;                                                                           aa5 

- PSHx:                                                                                           

08:53 Appendectomy; Cholecystectomy;                                                          aa5 

                                                                                                  

- Immunization history:: Adult Immunizations not up to date.                                      

- Social history:: Smoking status: Patient uses tobacco products, smokes one-half pack            

  cigarettes per day.                                                                             

- Ebola Screening: : No symptoms or risks identified at this time.                                

- Family history:: not pertinent.                                                                 

- Hospitalizations: : No recent hospitalization is reported.                                      

                                                                                                  

                                                                                                  

ROS:                                                                                              

11:16 Constitutional: Negative for fever, chills, and weight loss, Eyes: Negative for injury, rn  

      pain, redness, and discharge, Cardiovascular: Negative for chest pain, palpitations,        

      and edema, Respiratory: Negative for shortness of breath, cough, wheezing, and              

      pleuritic chest pain, Abdomen/GI: + abd pain/nausea/vomiting/diarrhea MS/Extremity:         

      Negative for injury and deformity, Skin: Negative for injury, rash, and discoloration,      

      Neuro: Negative for headache, weakness, numbness, tingling, and seizure.                    

                                                                                                  

Exam:                                                                                             

11:16 Constitutional:  This is a well developed, well nourished patient who is awake, alert,  rn  

      and in no acute distress. Head/Face:  Normocephalic, atraumatic. Eyes:  Pupils equal        

      round and reactive to light, extra-ocular motions intact.  Lids and lashes normal.          

      Conjunctiva and sclera are non-icteric and not injected.  Cornea within normal limits.      

      Periorbital areas with no swelling, redness, or edema. Cardiovascular:  Regular rate        

      and rhythm with a normal S1 and S2.  No gallops, murmurs, or rubs.  Normal PMI, no JVD.     

       No pulse deficits. Respiratory:  Lungs have equal breath sounds bilaterally, clear to      

      auscultation and percussion.  No rales, rhonchi or wheezes noted.  No increased work of     

      breathing, no retractions or nasal flaring. Abdomen/GI:  soft, + mild RUQ and right         

      sided abd tenderness, no rebound MS/ Extremity:  Pulses equal, no cyanosis.                 

      Neurovascular intact.  Full, normal range of motion.  Equal circumference. Neuro:           

      Awake and alert, GCS 15, oriented to person, place, time, and situation. Motor strength     

      5/5 in all extremities.  Sensory grossly intact.                                            

                                                                                                  

Vital Signs:                                                                                      

08:53  / 88; Pulse 100; Resp 18 S; Temp 98.2(TE); Pulse Ox 96% on R/A; Weight 90.72 kg  aa5 

      (R); Height 6 ft. 3 in. (190.50 cm) (R); Pain 10/10;                                        

10:47  / 94; Pulse 87; Resp 18; Pulse Ox 99% ;                                          aj1 

12:36  / 62; Pulse 92; Resp 18; Pulse Ox 99% ;                                          aj1 

14:47  / 95; Pulse 85; Resp 18; Pulse Ox 99% ;                                          aj1 

08:53 Body Mass Index 25.00 (90.72 kg, 190.50 cm)                                             aa5 

                                                                                                  

MDM:                                                                                              

08:56 Patient medically screened.                                                             rn  

11:16 Differential diagnosis: Nonspecific abd pain, gastritis, pancreatitis, viral            rn  

      gastroenteritis, gastroenteritis, colitis, choledocholithiasis. Data reviewed: vital        

      signs, nurses notes, old medical records, lab test result(s), and as a result, I will       

      admit patient. Counseling: I had a detailed discussion with the patient and/or guardian     

      regarding: the historical points, exam findings, and any diagnostic results supporting      

      the discharge/admit diagnosis, lab results, the need for further work-up and treatment      

      in the hospital. Admission orders: after a detailed discussion of the patient's             

      condition and case, the admit orders are written by me. ED course: Pt with persistent       

      and prolonged abd pain/vomiting/diarrhea, multiple ct showing colitis, not getting          

      better despite multiple rounds of abx, will observe in hospital to Dr. Bob with GI        

      consult as patient ahs been trying to get in but unable to..                                

                                                                                                  

                                                                                             

09:16 Order name: Basic Metabolic Panel; Complete Time: 10:14                                 rn  

                                                                                             

09:16 Order name: CBC with Diff; Complete Time: 10:14                                         rn  

                                                                                             

09:16 Order name: Creatinine for Radiology; Complete Time: 10:14                              rn  

                                                                                             

09:16 Order name: Hepatic Function; Complete Time: 10:14                                      rn  

                                                                                             

09:16 Order name: Lipase; Complete Time: 10:14                                                rn  

                                                                                             

09:16 Order name: Urine Microscopic Only; Complete Time: 10:14                                rn  

                                                                                             

09:16 Order name: IV Saline Lock; Complete Time: 09:49                                        rn  

                                                                                             

09:16 Order name: Labs collected and sent; Complete Time: 09:49                               rn  

                                                                                             

09:16 Order name: Urine Dipstick-Ancillary (obtain specimen); Complete Time: 09:49            rn  

                                                                                             

09:48 Order name: Urine Dipstick--Ancillary (enter results); Complete Time: 10:14             bd  

                                                                                                  

Administered Medications:                                                                         

12:18 Drug: Zofran 4 mg Route: IVP; Site: right antecubital;                                  aj1 

14:48 Follow up: Response: No adverse reaction                                                aj1 

                                                                                                  

                                                                                                  

Disposition:                                                                                      

18 11:21 Hospitalization ordered by Urmila Bob for Observation. Preliminary             

  diagnosis are Vomiting, Diarrhea, unspecified, Colitis.                                         

- Bed requested for Telemetry/MedSurg (observation).                                              

- Status is Observation.                                                                      aj1 

- Condition is Stable.                                                                            

- Problem is an ongoing problem.                                                                  

- Symptoms are unchanged.                                                                         

UTI on Admission? No                                                                              

                                                                                                  

                                                                                                  

                                                                                                  

Signatures:                                                                                       

Dispatcher [employer]                           EDMS                                                 

Stephanie Miller Angela, RN                     RN   aj1                                                  

Rudolph Mazariegos MD MD rn Calderon, Audri, RN                     RN   aa5                                                  

                                                                                                  

Corrections: (The following items were deleted from the chart)                                    

13:15 11:21 Hospitalization Ordered by Urmila Bob MD for Observation. Preliminary          bd  

      diagnosis is Vomiting; Diarrhea, unspecified; Colitis. Bed requested for                    

      Telemetry/MedSurg (observation). Status is Observation. Condition is Stable. Problem is     

      an ongoing problem. Symptoms are unchanged. UTI on Admission? No. rn                        

14:52 13:15 2018 11:21 Hospitalization Ordered by Urmila Bob MD for Observation.     aj1 

      Preliminary diagnosis is Vomiting; Diarrhea, unspecified; Colitis. Bed requested for        

      Telemetry/MedSurg (observation). Status is Observation. Condition is Stable. Problem is     

      an ongoing problem. Symptoms are unchanged. UTI on Admission? No. bd                        

                                                                                                  

**************************************************************************************************

## 2018-06-05 NOTE — CON
Date of Consultation:  06/05/2018



A 57-year-old male, 413, Dr. Bob for 13 Dr.



Reason For Consultation:  Abdominal pain, nausea, vomiting, diarrhea, and history or cirrhosis.



History Of Present Illness:  Mr. Ricci is a 57-year-old male, who comes again with abdominal vero
n in the same pattern located in the epigastric area.  No radiation.  In the past, he had similar pro
blem.  Multiple workup has been done including radiologic workup that is negative.  He states that hi
s pain is already better.  Although he complains of nausea vomiting, he states that he is hungry and 
he wants to eat.  He also has history of colitis and history of intermittent diarrhea.  Denies any he
matemesis, melena, or hematochezia.  Denies any odynophagia or dysphagia.  He has history of stable c
irrhosis.



Past Medical History:  In addition to above, hypertension.



Past Surgical History:  Not related to above.



Family History:  Denies any gastrointestinal malignancy in the family.



Social History:  Does not drink any more alcohol.



Allergies:  REVIEWED IN THE CHART.



Medications:  Reviewed in the chart.



Review of Systems:

General:  No fever, chills.  No antibiotic.  No weight loss, weight gain.  Hunger is unchanged 

HEPATOLOGIC:  As above. 

GI:  As above. 

Pulmonary:  No shortness of breath, cough, or expectoration. 

Cardiac:  No palpitation, heart murmur.  No orthopnea or dyspnea. 

Genitourinary:  No active complaint. 

Musculoskeletal:  no arthralgia, myalgia.  No stiffness. 

Dermatologic:  No pruritus. 

Neuropsychiatric:  No loss of consciousness.  No mental status change.



Physical Examination:

General:  Young male at this time.  No other acute distress noted.  Hemodynamic and respiratory profi
le within normal range.  

HEENT:  Atraumatic, normocephalic.  Oropharynx is clear. 

Neck:  Supple.  No lymphadenopathy.  Trachea central in position. 

Chest:  Clear to auscultation and percussion. 

Cardiovascular:  Normal S1, S2.  No S3.  No S4.  Abdomen:  At this time is soft, nontender, nondisten
ded.  Excellent bowel sounds in all quadrants.  No hepatomegaly.  No splenomegaly.  No ascites.  No s
uccussion splash. 

Neurologic:  Alert and oriented x3.  Intact memory, mentation, and judgment.  No asterixis.  No flapp
ing tremor.  Can move all parts of extremities without any other problem.



Diagnostic Data:  Reviewed and analyzed.



Impression, Plan, And Recommendation:  Mr. Ricci is a 57-year-old gentleman with alleged history
 of epigastric pain, nausea, vomiting, and history of cirrhosis. 



I will start his diet, treat him with proton pump inhibitor, and see how he progresses.  In addition 
to that, since he is having repeated nausea and vomiting, a small bowel follow through will be of imp
ortance since in the past other radiologic test did not reveal any result right away. 



Keep him on PPI and other general management including pain control. 



I have had a discussion with him regarding his management.  He has good understanding.  He is agreeab
kilo.





BAILEY/PRASHANT

DD:  06/05/2018 15:48:57Voice ID:  [id number]

DT:  06/05/2018 20:02:18Report ID:  [id number]

## 2018-06-06 VITALS — SYSTOLIC BLOOD PRESSURE: 178 MMHG | TEMPERATURE: 97 F | DIASTOLIC BLOOD PRESSURE: 93 MMHG

## 2018-06-06 LAB
ALBUMIN SERPL BCP-MCNC: 3.2 G/DL (ref 3.2–5.5)
ALP SERPL-CCNC: 130 IU/L (ref 42–121)
ALT SERPL W P-5'-P-CCNC: 91 IU/L (ref 10–60)
AST SERPL W P-5'-P-CCNC: 88 IU/L (ref 10–42)
BLD SMEAR INTERP: (no result)
BUN BLD-MCNC: 13 MG/DL (ref 6–20)
GLUCOSE SERPLBLD-MCNC: 113 MG/DL (ref 65–120)
HCT VFR BLD CALC: 37.5 % (ref 39.6–49)
LYMPHOCYTES # SPEC AUTO: 1.5 K/UL (ref 0.7–4.9)
MAGNESIUM SERPL-MCNC: 1.9 MG/DL (ref 1.8–2.5)
MCH RBC QN AUTO: 31.7 PG (ref 27–35)
MCV RBC: 89.1 FL (ref 80–100)
MORPHOLOGY BLD-IMP: (no result)
PMV BLD: 7.7 FL (ref 7.6–11.3)
POTASSIUM SERPL-SCNC: 4.6 MEQ/L (ref 3.6–5)
RBC # BLD: 4.21 M/UL (ref 4.33–5.43)

## 2018-06-06 RX ADMIN — METRONIDAZOLE SCH MLS: 500 INJECTION, SOLUTION INTRAVENOUS at 02:20

## 2018-06-06 RX ADMIN — Medication SCH ML: at 07:55

## 2018-06-06 RX ADMIN — CIPROFLOXACIN SCH MLS: 2 INJECTION, SOLUTION INTRAVENOUS at 07:55

## 2018-06-06 RX ADMIN — METRONIDAZOLE SCH MLS: 500 INJECTION, SOLUTION INTRAVENOUS at 07:55

## 2018-06-06 NOTE — P.DS
Admission Date: 06/05/18


Discharge Date: 06/06/18


Primary Care Provider: OOT


Disposition: ROUTINE DISCHARGE


Discharge Condition: GOOD


Reason for Admission: Abd Pain, Intractable N/V 





- Problems


(1) Intractable nausea and vomiting


Onset Date: 06/06/18   Status: Resolved   


Qualifiers: 


   Vomiting type: cyclical vomiting   Qualified Code(s): G43.A1 - Cyclical 

vomiting, intractable   





(2) Abdominal pain


Onset Date: 06/06/18   Status: Resolved   


Qualifiers: 


   Abdominal location: right lower quadrant   Qualified Code(s): R10.31 - Right 

lower quadrant pain   





(3) Liver cirrhosis


Onset Date: 06/06/18   Status: Chronic   


Qualifiers: 


   Hepatic cirrhosis type: alcoholic cirrhosis   Ascites presence: without 

ascites   Qualified Code(s): K70.30 - Alcoholic cirrhosis of liver without 

ascites   





(4) HTN (hypertension)


Onset Date: 04/18/18   Status: Chronic   


Qualifiers: 


   Hypertension type: essential hypertension 


Brief History of Present Illness: 





57-year-old male with significant past medical history of liver cirrhosis who 

presented to the ED complaining of having some right-sided abdominal pain that 

has gotten progressively worse.  Patient has had repeated admissions here in 

the hospital for the similar complaints.  Patient has a history of liver 

cirrhosis due to which she has chronic electrolyte abnormality along with 

colitis.  Patient however states that he has been having nausea and vomiting 

and has not been able to keep anything down.  Patient was admitted 2 weeks ago 

for the similar complaints was started on Cipro and Flagyl and was then 

discharged home with oral antibiotics and follow up with GI.  Patient however 

states that has not been able to get in to GI for a colonoscopy and is 

concerned that he might have a colon tumor that has been undiagnosed and this 

needed to get it worked out.  Patient states that he was waiting for a call 

from the GI doctors office however his right-sided abdominal pain got worse and 

his nausea vomiting worsened as a decided to come to the ER for further 

treatment.  Patient denies having any fever chills, chest pain or shortness of 

breath at home.


Hospital Course: 





Overall during the hospital course patient remained stable





The patient initially was admitted to the hospital for abdominal pain nausea 

vomiting and diarrhea. Had abd CT in May 2018 which was consistent with 

Colitis.  The patient also had elevated LFT's and hypoalbuminemia which is most 

likely consistent with liver cirrhosis which is worse due to dehydration.  

Patient was initially started on IV Cipro and Flagyl. Pt was switched to PO abx 

and advanced diet to GI soft. He was able to tolerate diet well.  Denied any 

nausea vomiting or diarrhea.  GI was consulted while patient was here in the 

hospital and agreed with the above plan and recommended the patient have an 

outpatient colonoscopy done once he is discharged from here and have his acute 

illness resolved.  Patient thus was given a followup appointment with GI as 

well.  The patient was to follow up with primary care doctor in 2 weeks and GI 

in 2 weeks.  Patient was to take p.o. Cipro and Flagyl for total of 10 days. 

Abstain from alcohol as well. 


Vital Signs/Physical Exam: 














Temp Pulse Resp BP Pulse Ox


 


 97.0 F   68   18   178/93 H  95 


 


 06/06/18 07:55  06/06/18 07:55  06/06/18 07:55  06/06/18 07:55  06/06/18 07:55








General: Alert, In no apparent distress


HEENT: Atraumatic, PERRLA, EOMI


Neck: Supple, JVD not distended


Respiratory: Clear to auscultation bilaterally, Normal air movement


Cardiovascular: Regular rate/rhythm, Normal S1 S2


Gastrointestinal: Normal bowel sounds, No tenderness


Musculoskeletal: No tenderness


Integumentary: No rashes


Neurological: Normal speech, Normal tone, Normal affect


Lymphatics: No axilla or inguinal lymphadenopathy


Laboratory Data at Discharge: 














WBC  4.0 K/uL (4.3-10.9)  L  06/06/18  05:13    


 


Hgb  13.4 g/dL (13.6-17.9)  L  06/06/18  05:13    


 


Hct  37.5 % (39.6-49.0)  L  06/06/18  05:13    


 


Plt Count  213 K/uL (152-406)  D 06/06/18  05:13    


 


Sodium  134 mEq/L (135-145)  L  06/06/18  05:13    


 


Potassium  4.6 mEq/L (3.6-5.0)   06/06/18  05:13    


 


BUN  13 mg/dL (6-20)   06/06/18  05:13    


 


Creatinine  1.07 mg/dL (0.61-1.24)   06/06/18  05:13    


 


Glucose  113 mg/dL ()   06/06/18  05:13    


 


Phosphorus  3.7 mg/dL (2.5-4.3)   06/06/18  05:13    


 


Magnesium  1.9 mg/dL (1.8-2.5)   06/06/18  05:13    


 


Total Bilirubin  1.5 mg/dL (0.3-1.2)  H  06/06/18  05:13    


 


AST  88 IU/L (10-42)  H  06/06/18  05:13    


 


ALT  91 IU/L (10-60)  H  06/06/18  05:13    


 


Alkaline Phosphatase  130 IU/L ()  H  06/06/18  05:13    


 


Lipase  41 U/L (22-51)   06/05/18  09:40    








Home Medications: 








Lisinopril 10 mg PO DAILY 06/05/18 


Ciprofloxacin HCl 500 mg PO DAILY #10 tablet 06/06/18 


Pantoprazole [Protonix  Tab] 40 mg PO DAILY #30 tab 06/06/18 


metroNIDAZOLE [Flagyl] 500 mg PO Q8H #30 tablet 06/06/18 





New Medications: 


Ciprofloxacin HCl 500 mg PO DAILY #10 tablet


metroNIDAZOLE [Flagyl] 500 mg PO Q8H #30 tablet


Pantoprazole [Protonix  Tab] 40 mg PO DAILY #30 tab


Diet: GI bland diet


Activity: Ad gerard


Followup: 


Williams Sheridan MD [ACTIVE - CAN ADMIT] - 1 Week

## 2018-07-06 ENCOUNTER — HOSPITAL ENCOUNTER (EMERGENCY)
Dept: HOSPITAL 97 - ER | Age: 58
Discharge: HOME | End: 2018-07-06
Payer: SELF-PAY

## 2018-07-06 VITALS — TEMPERATURE: 98.2 F

## 2018-07-06 VITALS — OXYGEN SATURATION: 100 %

## 2018-07-06 VITALS — SYSTOLIC BLOOD PRESSURE: 148 MMHG | DIASTOLIC BLOOD PRESSURE: 82 MMHG

## 2018-07-06 DIAGNOSIS — K52.9: Primary | ICD-10-CM

## 2018-07-06 DIAGNOSIS — I10: ICD-10-CM

## 2018-07-06 DIAGNOSIS — F17.210: ICD-10-CM

## 2018-07-06 PROCEDURE — 99283 EMERGENCY DEPT VISIT LOW MDM: CPT

## 2018-07-06 PROCEDURE — 96365 THER/PROPH/DIAG IV INF INIT: CPT

## 2018-07-06 PROCEDURE — 96368 THER/DIAG CONCURRENT INF: CPT

## 2018-07-06 PROCEDURE — 96375 TX/PRO/DX INJ NEW DRUG ADDON: CPT

## 2018-07-06 NOTE — EDPHYS
Physician Documentation                                                                           

 [facility]                                                                

Name: Nitin Ricci                                                                             

Age: 57 yrs                                                                                       

Sex: Male                                                                                         

: 1960                                                                                   

MRN: [id number]                                                                                   

Arrival Date: 2018                                                                          

Time: 08:08                                                                                       

Account#: [id number]                                                                            

Bed 16                                                                                            

Private MD: None, None                                                                            

ED Physician Rudolph Mazariegos                                                                         

HPI:                                                                                              

                                                                                             

08:30 This 57 yrs old  Male presents to ER via Ambulatory with complaints of         rn  

      Abdominal Pain.                                                                             

08:30 The patient presents with abdominal pain in the epigastric area, in the periumbilical   rn  

      area. Onset: The symptoms/episode began/occurred yesterday. The symptoms do not             

      radiate. Associated signs and symptoms: Pertinent positives: nausea and vomiting,           

      diarrhea, Pertinent negatives: fever, testicular pain, vomiting blood. The symptoms are     

      described as achy, crampy, intermittent. Modifying factors: The symptoms are alleviated     

      by nothing, the symptoms are aggravated by nothing. Severity of pain: At its worst the      

      pain was moderate in the emergency department the pain has improved. The patient has        

      experienced similar episodes in the past. The patient has been recently seen by a           

      physician:. Reports recent EGD, + gastritis, has outpt colonoscopy scheduled for next       

      week, reports began with abd pain/nausea/vomiting/diarrhea again, similar to previous       

      episodes, not any worse. .                                                                  

                                                                                                  

Historical:                                                                                       

- Allergies:                                                                                      

08:19 No Known Drug Allergies;                                                                hb  

- PMHx:                                                                                           

08:19 Hypertension;                                                                           hb  

- PSHx:                                                                                           

08:19 Appendectomy; Cholecystectomy;                                                          hb  

                                                                                                  

- Immunization history:: Adult Immunizations up to date.                                          

- Social history:: Smoking status: Patient uses tobacco products, smokes one pack                 

  cigarettes per day.                                                                             

- Ebola Screening: : No symptoms or risks identified at this time.                                

- Family history:: not pertinent.                                                                 

- Hospitalizations: : No recent hospitalization is reported.                                      

                                                                                                  

                                                                                                  

ROS:                                                                                              

08:30 Constitutional: Negative for fever, chills, and weight loss, Eyes: Negative for injury, rn  

      pain, redness, and discharge, Cardiovascular: Negative for chest pain, palpitations,        

      and edema, Respiratory: Negative for shortness of breath, cough, wheezing, and              

      pleuritic chest pain, Abdomen/GI: + abd pain/nausea/vomiting/diarrhea Back: Negative        

      for injury and pain, MS/Extremity: Negative for injury and deformity, Skin: Negative        

      for injury, rash, and discoloration, Neuro: Negative for headache, weakness, numbness,      

      tingling, and seizure.                                                                      

                                                                                                  

Exam:                                                                                             

08:30 Constitutional:  This is a well developed, well nourished patient who is awake, alert,  rn  

      and in no acute distress. Head/Face:  Normocephalic, atraumatic. ENT:  MMM                  

      Cardiovascular:  tachycardic, regular, no murmur Respiratory:  Lungs have equal breath      

      sounds bilaterally, clear to auscultation and percussion.  No rales, rhonchi or wheezes     

      noted.  No increased work of breathing, no retractions or nasal flaring. Abdomen/GI:        

      soft, mild epigastric and periumbilical tenderness, no rebound MS/ Extremity:  Pulses       

      equal, no cyanosis.  Neurovascular intact.  Full, normal range of motion.  Equal            

      circumference. Neuro:  Awake and alert, GCS 15, oriented to person, place, time, and        

      situation. Motor strength 5/5 in all extremities.  Sensory grossly intact.                  

                                                                                                  

Vital Signs:                                                                                      

08:17  / 78; Pulse 102; Resp 16; Temp 98.2; Pain 8/10;                                  hb  

09:00  / 83; Pulse 90; Resp 15; Pulse Ox 100% on R/A;                                   hb  

10:00  / 82; Pulse 86; Resp 15; Pulse Ox 100% on R/A;                                   hb  

                                                                                                  

MDM:                                                                                              

08:16 Patient medically screened.                                                             rn  

10:29 Differential diagnosis: diverticulitis, colitis, enteritis. Data reviewed: vital signs, rn  

      nurses notes, old medical records, and as a result, I will discharge patient.               

      Counseling: I had a detailed discussion with the patient and/or guardian regarding: the     

      historical points, exam findings, and any diagnostic results supporting the                 

      discharge/admit diagnosis, the need for outpatient follow up, to return to the              

      emergency department if symptoms worsen or persist or if there are any questions or         

      concerns that arise at home. Response to treatment: the patient's symptoms have             

      markedly improved after treatment, patient is well hydrated. and as a result, I will        

      discharge patient. Special discussion: Based on the patient's Hx, exam, and Dx              

      evaluation, there is no indication for emergent surgery or inpatient Tx. It is              

      understood by the patient/guardian that if the Sx's persist or worsen they need to          

      return immediately for re-evaluation. I discussed with the patient/guardian in detail       

      that at this point there is no indication for admission to the hospital. It is              

      understood, however, that if the symptoms persist or worsen the patient needs to return     

      immediately for re-evaluation. Based on the history and exam findings, there is no          

      indication for further emergent testing or inpatient evaluation. I discussed with the       

      patient/guardian the need to see the gastroenterologist for further evaluation of the       

      symptoms.                                                                                   

                                                                                                  

                                                                                             

08:28 Order name: IV Start; Complete Time: 08:39                                              rn  

                                                                                                  

Administered Medications:                                                                         

08:41 Drug: NS 0.9% 1000 ml Route: IV; Rate: 1000 ml; Site: left antecubital;                 hb  

09:30 Follow up: Response: No adverse reaction; IV Status: Completed infusion                 hb  

08:41 Drug: Zofran 4 mg Route: IVP; Site: left antecubital;                                   hb  

09:10 Follow up: Response: No adverse reaction; Nausea is decreased                           hb  

08:42 Drug: LevaQUIN 500 mg Volume: 100 ml; Route: IVPB; Infused Over: 60 mins; Site: left    hb  

      antecubital;                                                                                

09:42 Follow up: Response: No adverse reaction; IV Status: Completed infusion                 hb  

08:42 Drug: Flagyl 500 mg Volume: 100 ml; Route: IVPB; Rate: 200 ml/hr; Infused Over: 30      hb  

      mins; Site: left antecubital;                                                               

09:15 Follow up: Response: No adverse reaction; IV Status: Completed infusion                 hb  

                                                                                                  

                                                                                                  

Disposition:                                                                                      

18 10:30 Discharged to Home. Impression: Colitis, Vomiting, Diarrhea, unspecified.          

- Condition is Stable.                                                                            

- Discharge Instructions: Diarrhea, Nausea and Vomiting.                                          

- Prescriptions for Zofran ODT 4 mg Oral tablet,disintegrating - place 1 tablet by                

  TRANSLINGUAL route every 8-10 hours As needed; 20 tablet. Flagyl 500 mg Oral Tablet -           

  take 1 tablet by ORAL route every 8 hours for 10 days; 30 tablet. Levaquin 750 mg               

  Oral Tablet - take 1 tablet by ORAL route once daily for 10 days; 10 tablet.                    

- Medication Reconciliation Form, Thank You Letter, Antibiotic Education, Prescription            

  Opioid Use form.                                                                                

- Follow up: Private Physician; When: As needed; Reason: Recheck today's complaints,              

  Re-evaluation by your physician.                                                                

- Problem is an acute exacerbation.                                                               

- Symptoms have improved.                                                                         

                                                                                                  

                                                                                                  

                                                                                                  

Signatures:                                                                                       

Rudolph Mazariegos MD MD rn Baxter, Heather, RN RN                                                      

                                                                                                  

Corrections: (The following items were deleted from the chart)                                    

10:46 10:30 2018 10:30 Discharged to Home. Impression: Colitis; Vomiting; Diarrhea,     hb  

      unspecified. Condition is Stable. Forms are Medication Reconciliation Form, Thank You       

      Letter, Antibiotic Education, Prescription Opioid Use. Follow up: Private Physician;        

      When: As needed; Reason: Recheck today's complaints, Re-evaluation by your physician.       

      Problem is an acute exacerbation. Symptoms have improved. rn                                

                                                                                                  

**************************************************************************************************

## 2018-07-06 NOTE — ER
Nurse's Notes                                                                                     

 [facility]                                                                

Name: Nitin Ricci                                                                             

Age: 57 yrs                                                                                       

Sex: Male                                                                                         

: 1960                                                                                   

MRN: [id number]                                                                                   

Arrival Date: 2018                                                                          

Time: 08:08                                                                                       

Account#: [id number]                                                                            

Bed 16                                                                                            

Private MD: None, None                                                                            

Diagnosis: Colitis;Vomiting;Diarrhea, unspecified                                                 

                                                                                                  

Presentation:                                                                                     

                                                                                             

08:15 Initial Sepsis Screen: Does the patient meet any 2 criteria? No. Patient's initial      hb  

      sepsis screen is negative. Does the patient have a suspected source of infection? No.       

      Patient's initial sepsis screen is negative.                                                

08:19 Presenting complaint: Patient states: Abdominal pain and N/V/D since Tuesday.           hb  

      Transition of care: patient was not received from another setting of care. Onset of         

      symptoms was 2018. Risk Assessment: Do you want to hurt yourself or someone        

      else? Patient reports no desire to harm self or others. Care prior to arrival: None.        

08:19 Acuity: PREETI 3                                                                           hb  

08:19 Method Of Arrival: Ambulatory                                                           hb  

                                                                                                  

Historical:                                                                                       

- Allergies:                                                                                      

08:19 No Known Drug Allergies;                                                                hb  

- PMHx:                                                                                           

08:19 Hypertension;                                                                           hb  

- PSHx:                                                                                           

08:19 Appendectomy; Cholecystectomy;                                                          hb  

                                                                                                  

- Immunization history:: Adult Immunizations up to date.                                          

- Social history:: Smoking status: Patient uses tobacco products, smokes one pack                 

  cigarettes per day.                                                                             

- Ebola Screening: : No symptoms or risks identified at this time.                                

- Family history:: not pertinent.                                                                 

- Hospitalizations: : No recent hospitalization is reported.                                      

                                                                                                  

                                                                                                  

Screenin:19 Abuse screen: Denies threats or abuse. Denies injuries from another. Nutritional        hb  

      screening: No deficits noted. Tuberculosis screening: No symptoms or risk factors           

      identified. Fall Risk None identified.                                                      

                                                                                                  

Assessment:                                                                                       

08:15 General: Appears in no apparent distress. Behavior is calm, cooperative. Pain: Pain     hb  

      currently is 8 out of 10 on a pain scale. Neuro: Level of Consciousness is awake,           

      alert, obeys commands, Oriented to person, place, time, situation. Cardiovascular:          

      Capillary refill < 3 seconds Patient's skin is warm and dry. Respiratory: Airway is         

      patent Trachea midline Respiratory effort is even, unlabored, Respiratory pattern is        

      regular, symmetrical. GI: Abdomen is non-distended, Bowel sounds present X 4 quads. Abd     

      is soft X 4 quads Abdomen is tender to palpation in right upper quadrant and left upper     

      quadrant. : No signs and/or symptoms were reported regarding the genitourinary            

      system. EENT: No signs and/or symptoms were reported regarding the EENT system. Derm:       

      No signs and/or symptoms reported regarding the dermatologic system. Skin is intact, is     

      healthy with good turgor, Skin is pink, warm \T\ dry. Musculoskeletal: No signs and/or      

      symptoms reported regarding the musculoskeletal system.                                     

09:00 Reassessment: Patient appears in no apparent distress at this time. No changes from     hb  

      previously documented assessment. Patient and/or family updated on plan of care and         

      expected duration. Pain level reassessed. Patient is alert, oriented x 3, equal             

      unlabored respirations, skin warm/dry/pink.                                                 

10:19 Reassessment: Patient appears in no apparent distress at this time. No changes from     hb  

      previously documented assessment. Patient and/or family updated on plan of care and         

      expected duration. Pain level reassessed. Patient is alert, oriented x 3, equal             

      unlabored respirations, skin warm/dry/pink.                                                 

                                                                                                  

Vital Signs:                                                                                      

08:17  / 78; Pulse 102; Resp 16; Temp 98.2; Pain 8/10;                                  hb  

09:00  / 83; Pulse 90; Resp 15; Pulse Ox 100% on R/A;                                   hb  

10:00  / 82; Pulse 86; Resp 15; Pulse Ox 100% on R/A;                                   hb  

                                                                                                  

ED Course:                                                                                        

08:08 Patient arrived in ED.                                                                  mr  

08:09 None, None is Private Physician.                                                        mr  

08:16 Rudolph Mazariegos MD is Attending Physician.                                                rn  

08:17 Frances Johansen RN is Primary Nurse.                                                   hb  

08:19 Patient has correct armband on for positive identification. Bed in low position. Call   hb  

      light in reach. Side rails up X 1.                                                          

08:21 Triage completed.                                                                       hb  

08:39 Inserted saline lock: 20 gauge in left antecubital area, using aseptic technique.       dh3 

09:00 Arm band placed on.                                                                     hb  

10:44 No provider procedures requiring assistance completed. IV discontinued, intact,         hb  

      bleeding controlled, No redness/swelling at site. Pressure dressing applied.                

                                                                                                  

Administered Medications:                                                                         

08:41 Drug: NS 0.9% 1000 ml Route: IV; Rate: 1000 ml; Site: left antecubital;                 hb  

09:30 Follow up: Response: No adverse reaction; IV Status: Completed infusion                 hb  

08:41 Drug: Zofran 4 mg Route: IVP; Site: left antecubital;                                   hb  

09:10 Follow up: Response: No adverse reaction; Nausea is decreased                           hb  

08:42 Drug: LevaQUIN 500 mg Volume: 100 ml; Route: IVPB; Infused Over: 60 mins; Site: left    hb  

      antecubital;                                                                                

09:42 Follow up: Response: No adverse reaction; IV Status: Completed infusion                 hb  

08:42 Drug: Flagyl 500 mg Volume: 100 ml; Route: IVPB; Rate: 200 ml/hr; Infused Over: 30      hb  

      mins; Site: left antecubital;                                                               

09:15 Follow up: Response: No adverse reaction; IV Status: Completed infusion                 hb  

                                                                                                  

                                                                                                  

Outcome:                                                                                          

10:30 Discharge ordered by MD.                                                                rn  

10:44 Discharged to home ambulatory.                                                            

10:44 Condition: stable                                                                           

10:44 Discharge instructions given to patient, Instructed on discharge instructions, follow       

      up and referral plans. medication usage, Demonstrated understanding of instructions,        

      follow-up care, medications, Prescriptions given X 3.                                       

10:46 Patient left the ED.                                                                      

                                                                                                  

Signatures:                                                                                       

Re Springer Roman, MD MD rn Baxter, Heather, RN RN hb Herrera, Deanna                              3                                                  

                                                                                                  

**************************************************************************************************

## 2018-08-04 ENCOUNTER — HOSPITAL ENCOUNTER (EMERGENCY)
Dept: HOSPITAL 97 - ER | Age: 58
Discharge: HOME | End: 2018-08-04
Payer: SELF-PAY

## 2018-08-04 VITALS — TEMPERATURE: 98.1 F

## 2018-08-04 VITALS — OXYGEN SATURATION: 97 % | DIASTOLIC BLOOD PRESSURE: 88 MMHG | SYSTOLIC BLOOD PRESSURE: 155 MMHG

## 2018-08-04 DIAGNOSIS — R10.2: Primary | ICD-10-CM

## 2018-08-04 DIAGNOSIS — F17.210: ICD-10-CM

## 2018-08-04 DIAGNOSIS — R06.02: ICD-10-CM

## 2018-08-04 DIAGNOSIS — I10: ICD-10-CM

## 2018-08-04 LAB
ALBUMIN SERPL BCP-MCNC: 3.1 G/DL (ref 3.4–5)
ALP SERPL-CCNC: 335 U/L (ref 45–117)
ALT SERPL W P-5'-P-CCNC: 82 U/L (ref 12–78)
AMYLASE SERPL-CCNC: 21 U/L (ref 25–115)
AST SERPL W P-5'-P-CCNC: 173 U/L (ref 15–37)
BUN BLD-MCNC: 16 MG/DL (ref 7–18)
GLUCOSE SERPLBLD-MCNC: 186 MG/DL (ref 74–106)
HCT VFR BLD CALC: 37.6 % (ref 39.6–49)
LIPASE SERPL-CCNC: 237 U/L (ref 73–393)
LYMPHOCYTES # SPEC AUTO: 1.7 K/UL (ref 0.7–4.9)
MCH RBC QN AUTO: 33.2 PG (ref 27–35)
MCV RBC: 89 FL (ref 80–100)
PMV BLD: 8.6 FL (ref 7.6–11.3)
POTASSIUM SERPL-SCNC: 3.7 MMOL/L (ref 3.5–5.1)
RBC # BLD: 4.22 M/UL (ref 4.33–5.43)
UA COMPLETE W REFLEX CULTURE PNL UR: (no result)

## 2018-08-04 PROCEDURE — 99285 EMERGENCY DEPT VISIT HI MDM: CPT

## 2018-08-04 PROCEDURE — 83690 ASSAY OF LIPASE: CPT

## 2018-08-04 PROCEDURE — 81015 MICROSCOPIC EXAM OF URINE: CPT

## 2018-08-04 PROCEDURE — 80076 HEPATIC FUNCTION PANEL: CPT

## 2018-08-04 PROCEDURE — 93970 EXTREMITY STUDY: CPT

## 2018-08-04 PROCEDURE — 96361 HYDRATE IV INFUSION ADD-ON: CPT

## 2018-08-04 PROCEDURE — 85025 COMPLETE CBC W/AUTO DIFF WBC: CPT

## 2018-08-04 PROCEDURE — 74177 CT ABD & PELVIS W/CONTRAST: CPT

## 2018-08-04 PROCEDURE — 36415 COLL VENOUS BLD VENIPUNCTURE: CPT

## 2018-08-04 PROCEDURE — 71045 X-RAY EXAM CHEST 1 VIEW: CPT

## 2018-08-04 PROCEDURE — 85379 FIBRIN DEGRADATION QUANT: CPT

## 2018-08-04 PROCEDURE — 81003 URINALYSIS AUTO W/O SCOPE: CPT

## 2018-08-04 PROCEDURE — 70450 CT HEAD/BRAIN W/O DYE: CPT

## 2018-08-04 PROCEDURE — 96360 HYDRATION IV INFUSION INIT: CPT

## 2018-08-04 PROCEDURE — 84443 ASSAY THYROID STIM HORMONE: CPT

## 2018-08-04 PROCEDURE — 80048 BASIC METABOLIC PNL TOTAL CA: CPT

## 2018-08-04 PROCEDURE — 82150 ASSAY OF AMYLASE: CPT

## 2018-08-04 PROCEDURE — 93005 ELECTROCARDIOGRAM TRACING: CPT

## 2018-08-04 NOTE — RAD REPORT
EXAM DESCRIPTION:  RAD - Chest Single View - 8/4/2018 10:17 am

 

CLINICAL HISTORY:  DYSPNEA

Chest pain.

 

COMPARISON:  Chest Single View dated 5/8/2018; CHEST SINGLE VIEW dated 4/17/2015; CHEST SINGLE VIEW d
ated 6/12/2014; CHEST SINGLE VIEW dated 3/5/2014

 

FINDINGS:  Portable technique limits examination quality.

 

The lungs are grossly clear. The heart is normal in size. No displaced fractures.

 

IMPRESSION:  No acute intrathoracic process suspected.

## 2018-08-04 NOTE — RAD REPORT
EXAM DESCRIPTION:  CTAbdomen   Pelvis W Contrast - 8/4/2018 10:35 am

 

CLINICAL HISTORY:  Abdominal pain.

iv only;Abd pain

 

COMPARISON:  Abdomen   Pelvis W Contrast dated 5/21/2018; Abdomen   Pelvis W Contrast dated 4/17/2018
; Abdomen   Pelvis W Contrast dated 1/19/2018; CT ABD PELVIS W CONTRAST dated 4/17/2015

 

TECHNIQUE:  Biphasic CT imaging of the abdomen and pelvis was performed with 100 ml non-ionic IV cont
rast.

 

All CT scans are performed using dose optimization technique as appropriate and may include automated
 exposure control or mA/KV adjustment according to patient size.

 

FINDINGS:  The lung bases are clear.

 

Diffuse fatty liver is noted. Cholecystectomy clips are seen. No focal liver mass. The spleen, pancre
as, adrenal glands and kidneys are within normal limits.

 

No bowel obstruction, free air, free fluid or abscess.  The appendix is not identified as a discrete 
structure, however, no secondary findings of appendicitis are identified.   No evidence of significan
t lymphadenopathy.

 

No suspicious bony findings. Small fat containing left inguinal hernia.

 

IMPRESSION:  Diffuse fatty liver.

 

Small fat containing left inguinal hernia.

## 2018-08-04 NOTE — ER
Nurse's Notes                                                                                     

 [facility]                                                                

Name: Nitin Ricci                                                                             

Age: 57 yrs                                                                                       

Sex: Male                                                                                         

: 1960                                                                                   

MRN: [id number]                                                                                   

Arrival Date: 2018                                                                          

Time: 08:34                                                                                       

Account#: [id number]                                                                            

Bed 13                                                                                            

Private MD: None, None                                                                            

Diagnosis: Abdominal and pelvic pain;Shortness of breath                                          

                                                                                                  

Presentation:                                                                                     

                                                                                             

08:40 Presenting complaint: Patient states: i had polys removed during a colonscopy  tw2 

      i was doing ok but now my stomach hurts. Transition of care: patient was not received       

      from another setting of care. Onset of symptoms was 2018. Risk Assessment:       

      Do you want to hurt yourself or someone else? Patient reports no desire to harm self or     

      others. Initial Sepsis Screen: Does the patient meet any 2 criteria? No. Patient's          

      initial sepsis screen is negative. Does the patient have a suspected source of              

      infection? No. Patient's initial sepsis screen is negative. Care prior to arrival: None.    

08:40 Method Of Arrival: Ambulatory                                                           tw2 

08:40 Acuity: PREETI 3                                                                           tw2 

                                                                                                  

Historical:                                                                                       

- Allergies:                                                                                      

08:42 No Known Drug Allergies;                                                                tw2 

- Home Meds:                                                                                      

08:42 None [Active];                                                                          tw2 

- PMHx:                                                                                           

08:42 Hypertension;                                                                           tw2 

- PSHx:                                                                                           

08:42 Appendectomy; Cholecystectomy;                                                          tw2 

                                                                                                  

- Immunization history:: Adult Immunizations.                                                     

- Social history:: Smoking status: Patient uses tobacco products, smokes one pack                 

  cigarettes per day. Patient uses alcohol, on a daily basis.                                     

- Ebola Screening: : Patient denies travel to an Ebola-affected area in the 21 days               

  before illness onset.                                                                           

                                                                                                  

                                                                                                  

Screenin:42 Abuse screen: Denies threats or abuse. Nutritional screening: No deficits noted.        tw2 

      Tuberculosis screening: No symptoms or risk factors identified. Fall Risk None              

      identified.                                                                                 

                                                                                                  

Assessment:                                                                                       

08:44 General: Appears in no apparent distress. Behavior is calm, cooperative, appropriate    tw2 

      for age. Pain: Complains of pain in abdomen. Neuro: Level of Consciousness is awake,        

      alert, obeys commands, Oriented to person, place, time, situation. Cardiovascular:          

      Denies chest pain, shortness of breath, Heart tones S1 S2 Capillary refill < 3 seconds      

      Patient's skin is warm and dry. Respiratory: Airway is patent Respiratory effort is         

      even, unlabored, Respiratory pattern is regular, symmetrical, Breath sounds are clear       

      bilaterally. GI: Abdomen is flat, non-distended, Bowel sounds present X 4 quads. Abd is     

      soft X 4 quads Reports diarrhea, nausea. : No signs and/or symptoms were reported         

      regarding the genitourinary system. EENT: No signs and/or symptoms were reported            

      regarding the EENT system. Derm: No signs and/or symptoms reported regarding the            

      dermatologic system. Musculoskeletal: Range of motion: intact in all extremities,           

      Reports numbness in left arm.                                                               

09:49 Reassessment: Patient appears in no apparent distress at this time. No changes from     tw2 

      previously documented assessment. Patient and/or family updated on plan of care and         

      expected duration. Pain level reassessed. Patient is alert, oriented x 3, equal             

      unlabored respirations, skin warm/dry/pink.                                                 

11:15 Reassessment: Patient appears in no apparent distress at this time. No changes from     tw2 

      previously documented assessment. Patient and/or family updated on plan of care and         

      expected duration. Pain level reassessed. Patient is alert, oriented x 3, equal             

      unlabored respirations, skin warm/dry/pink.                                                 

12:34 Reassessment: Patient appears in no apparent distress at this time. No changes from     tw2 

      previously documented assessment. Patient and/or family updated on plan of care and         

      expected duration. Pain level reassessed. Patient is alert, oriented x 3, equal             

      unlabored respirations, skin warm/dry/pink.                                                 

13:10 Reassessment: Patient appears in no apparent distress at this time. No changes from     tw2 

      previously documented assessment. Patient and/or family updated on plan of care and         

      expected duration. Pain level reassessed. Patient is alert, oriented x 3, equal             

      unlabored respirations, skin warm/dry/pink.                                                 

                                                                                                  

Vital Signs:                                                                                      

08:42  / 96; Pulse 117; Resp 18; Temp 98.1(O); Pulse Ox 97% on R/A; Weight 86.18 kg     tw2 

      (R); Height 6 ft. 3 in. (190.50 cm); Pain 7/10;                                             

09:49  / 66; Pulse 105; Resp 17; Pulse Ox 98% on R/A;                                   tw2 

11:12  / 90; Pulse 97; Resp 17; Pulse Ox 96% on R/A;                                    tw2 

12:34  / 91; Pulse 94; Resp 17; Pulse Ox 98% on R/A;                                    tw2 

13:10  / 88; Pulse 91; Resp 17; Pulse Ox 97% on R/A;                                    tw2 

08:42 Body Mass Index 23.75 (86.18 kg, 190.50 cm)                                             tw2 

                                                                                                  

NIH Stroke Scale Scores:                                                                          

10:53 NIHSS Score: 0                                                                          [id number] 

                                                                                                  

ED Course:                                                                                        

08:34 Patient arrived in ED.                                                                  mr  

08:34 None, None is Private Physician.                                                        mr  

08:40 Shannon Lim, RN is Primary Nurse.                                                        tw2 

08:41 Triage completed.                                                                       tw2 

08:41 Arm band placed on.                                                                     tw2 

08:42 Placed in gown. Bed in low position. Call light in reach. Cardiac monitor on. Pulse ox  tw2 

      on. NIBP on.                                                                                

08:43 Harley Hernandez PA is PHCP.                                                               jr8 

08:43 Reed Rausch MD is Attending Physician.                                             jr8 

08:54 Inserted saline lock: 20 gauge in left antecubital area, using aseptic technique. Blood dh3 

      collected.                                                                                  

09:02 EKG done, by ED staff, reviewed by Harley WOODS.                                      dh3 

10:14 X-ray completed. Portable x-ray completed in exam room. Patient tolerated procedure     ag1 

      well.                                                                                       

10:16 XRAY Chest (1 view) In Process Unspecified.                                             EDMS

10:17 Patient moved to CT.                                                                    cw1 

10:34 CT completed. Patient moved back from CT.                                               vm2 

10:34 CT Head Brain wo Cont In Process Unspecified.                                           EDMS

10:34 CT Abd/Pelvis - W/Contrast In Process Unspecified.                                      EDMS

13:09 Ultrasound completed. Patient tolerated well.                                           sg3 

13:09 US Extremity Venous W Compression Thang In Process Unspecified.                           EDMS

13:19 Too Huber MD is Referral Physician.                                                jr8 

13:33 No provider procedures requiring assistance completed. IV discontinued, intact,         tw2 

      bleeding controlled, No redness/swelling at site. Pressure dressing applied.                

                                                                                                  

Administered Medications:                                                                         

11:34 Drug: NS 0.9% 1000 ml Route: IV; Rate: 1000 ml; Site: left antecubital;                 tw2 

13:06 Follow up: Response: No adverse reaction; IV Status: Completed infusion; IV Intake:     tw2 

      1000ml                                                                                      

                                                                                                  

                                                                                                  

Intake:                                                                                           

13:06 IV: 1000ml; Total: 1000ml.                                                              tw2 

                                                                                                  

Outcome:                                                                                          

13:19 Discharge ordered by MD.                                                                jr8 

13:33 Discharged to home ambulatory.                                                          tw2 

13:33 Condition: stable                                                                           

13:33 Discharge instructions given to patient, Instructed on discharge instructions, follow       

      up and referral plans. Demonstrated understanding of instructions, follow-up care.          

13:34 Patient left the ED.                                                                    tw2 

                                                                                                  

                                                                                                  

NIH Stroke Scale - NIH Stroke Score                                                               

Date: 2018                                                                                  

Time: 10:53                                                                                       

Total Score = 0                                                                                   

  1a. Level of Consciousness (LOC) - 0(Alert)                                                     

  1b. Level of Consciousness (LOC) (Year \T\ Age) - 0(Both)                                       

  1c. LOC Commands (Open \T\ Closes Eyes/) - 0(Both)                                          

   2. Best Gaze (Lateral Gaze Paresis) - 0(Normal)                                                

   3. Visual Field Loss - 0(No visual loss)                                                       

   4. Facial Palsy - 0(Normal)                                                                    

  5a. Left Arm: Motor (10-second hold) - 0(No drift)                                              

  5b. Right Arm: Motor (10-second hold) - 0(No drift)                                             

  6a. Left Leg: Motor (5-second hold - always test supine) - 0(No drift)                          

  6b. Right Leg: Motor (5-second hold - always test supine) - 0(No drift)                         

   7. Limb Ataxia (finger/nose \T\ heel/shin - test with eyes open) - 0(Absent)                   

   8. Sensory Loss (pinprick arms/legs/face) - 0(Normal)                                          

   9. Best Language: Aphasia (description/naming/reading) - 0(No aphasia)                         

  10. Dysarthria (speech clarity - read or repeat words) - 0(Normal)                              

  11. Extinction and Inattention (visual/tactile/auditory/spatial/personal) - 0(No                

      abnormality)                                                                                

Initials: di                                                                                     

                                                                                                  

Signatures:                                                                                       

Dispatcher [employer]                           Re Cordova                                mr GarcíaMayra                             cw1                                                  

Harley Hernandez PA                        PA   jr8                                                  

Marleen Angeles1                                                  

Shannon Lim, DANIEL                          RN   tw2                                                  

Yaneth Rivera2                                                  

Tami Lee                              3                                                  

Stacie Chaidez3                                                  

                                                                                                  

**************************************************************************************************

## 2018-08-04 NOTE — RAD REPORT
EXAM DESCRIPTION:  VAS - Extrem Venous W Compress Thang - 8/4/2018 1:09 pm

 

CLINICAL HISTORY:  elevated DD. R/O lower extremity clots

Bilateral leg edema and swelling.

 

COMPARISON:  Extrem Venous W Compress Thang dated 5/8/2018; Chest Single View dated 5/8/2018

 

TECHNIQUE:  Real-time sonographic interrogation of the left and right lower extremity deep venous sys
tems was performed.

 

FINDINGS:  Normal compressibility, flow augmentation, phasic flow and spontaneous flow is identified 
in both the left and right lower extremity deep venous systems.

 

IMPRESSION:  No sonographic evidence of left or right lower extremity deep venous thrombosis.

## 2018-08-04 NOTE — EDPHYS
Physician Documentation                                                                           

 [facility]                                                                

Name: Nitin Ricci                                                                             

Age: 57 yrs                                                                                       

Sex: Male                                                                                         

: 1960                                                                                   

MRN: [id number]                                                                                   

Arrival Date: 2018                                                                          

Time: 08:34                                                                                       

Account#: [id number]                                                                            

Bed 13                                                                                            

Private MD: None, None                                                                            

ED Physician Reed Rausch                                                                      

HPI:                                                                                              

                                                                                             

10:53 This 57 yrs old  Male presents to ER via Ambulatory with complaints of         jr8 

      Abdominal Pain, Headache.                                                                   

10:53 Patient stated that for the past couple of weeks has had abdominal pain and nausea.     jr8 

      Stated that he recently had colonoscopy prior to pain and had polyps removed. Stated        

      that he now has headache and feel that his left arm is week. Patient admitted to recent     

      alcohol consumption as well because he could not tolerate the pain . It is unknown          

      whether or not the patient has had similar symptoms in the past. The patient has been       

      recently seen by a physician:.                                                              

                                                                                                  

Historical:                                                                                       

- Allergies:                                                                                      

08:42 No Known Drug Allergies;                                                                tw2 

- Home Meds:                                                                                      

08:42 None [Active];                                                                          tw2 

- PMHx:                                                                                           

08:42 Hypertension;                                                                           tw2 

- PSHx:                                                                                           

08:42 Appendectomy; Cholecystectomy;                                                          tw2 

                                                                                                  

- Immunization history:: Adult Immunizations.                                                     

- Social history:: Smoking status: Patient uses tobacco products, smokes one pack                 

  cigarettes per day. Patient uses alcohol, on a daily basis.                                     

- Ebola Screening: : Patient denies travel to an Ebola-affected area in the 21 days               

  before illness onset.                                                                           

                                                                                                  

                                                                                                  

ROS:                                                                                              

10:53 Eyes: Negative for injury, pain, redness, and discharge, ENT: Negative for injury,      jr8 

      pain, and discharge, Neck: Negative for injury, pain, and swelling, Cardiovascular:         

      Negative for chest pain, palpitations, and edema, Respiratory: Negative for shortness       

      of breath, cough, wheezing, and pleuritic chest pain, Back: Negative for injury and         

      pain, MS/Extremity: Negative for injury and deformity, Skin: Negative for injury, rash,     

      and discoloration.                                                                          

10:53 Abdomen/GI: Positive for abdominal pain, nausea and vomiting, Negative for diarrhea,        

      constipation, abdominal cramps, abdominal distension, anorexia, dysphagia, hematemesis,     

      black/tarry stool, rectal pain, rectal bleeding, bowel incontinence, flatulence.            

10:53 Neuro: Positive for headache, weakness, Negative for altered mental status, dizziness,      

      gait disturbance, hearing loss, loss of consciousness, numbness, seizure activity,          

      speech changes, syncope, near syncope, tingling, tinnitus, tremor, visual changes.          

                                                                                                  

Exam:                                                                                             

10:53 Eyes:  Pupils equal round and reactive to light, extra-ocular motions intact.  Lids and jr8 

      lashes normal.  Conjunctiva and sclera are non-icteric and not injected.  Cornea within     

      normal limits.  Periorbital areas with no swelling, redness, or edema. ENT:  Nares          

      patent. No nasal discharge, no septal abnormalities noted.  Tympanic membranes are          

      normal and external auditory canals are clear.  Oropharynx with no redness, swelling,       

      or masses, exudates, or evidence of obstruction, uvula midline.  Mucous membranes           

      moist. Neck:  Trachea midline, no thyromegaly or masses palpated, and no cervical           

      lymphadenopathy.  Supple, full range of motion without nuchal rigidity, or vertebral        

      point tenderness.  No Meningismus. Cardiovascular:  Regular rate and rhythm with a          

      normal S1 and S2.  No gallops, murmurs, or rubs.  Normal PMI, no JVD.  No pulse             

      deficits. Respiratory:  Lungs have equal breath sounds bilaterally, clear to                

      auscultation and percussion.  No rales, rhonchi or wheezes noted.  No increased work of     

      breathing, no retractions or nasal flaring. Back:  No spinal tenderness.  No                

      costovertebral tenderness.  Full range of motion. Skin:  Warm, dry with normal turgor.      

      Normal color with no rashes, no lesions, and no evidence of cellulitis. MS/ Extremity:      

      Pulses equal, no cyanosis.  Neurovascular intact.  Full, normal range of motion. Neuro:     

       Awake and alert, GCS 15, oriented to person, place, time, and situation.  Cranial          

      nerves II-XII grossly intact.  Motor strength 5/5 in all extremities.  Sensory grossly      

      intact.  Cerebellar exam normal.  Normal gait.                                              

10:53 Abdomen/GI: Inspection: abdomen appears normal, Bowel sounds: active, all quadrants,        

      Palpation: soft, in all quadrants, moderate abdominal tenderness, in the abdomen            

      diffusely, mass, is not appreciated, rebound tenderness, is not appreciated, voluntary      

      guarding, is not appreciated, involuntary guarding, is not appreciated, no appreciated      

      organomegaly, Indicators: McBurney's point is not tender, Greene's sign is negative,        

      Rovsing's sign is negative, Liver: tenderness, is not appreciated.                          

                                                                                                  

Vital Signs:                                                                                      

08:42  / 96; Pulse 117; Resp 18; Temp 98.1(O); Pulse Ox 97% on R/A; Weight 86.18 kg     tw2 

      (R); Height 6 ft. 3 in. (190.50 cm); Pain 7/10;                                             

09:49  / 66; Pulse 105; Resp 17; Pulse Ox 98% on R/A;                                   tw2 

11:12  / 90; Pulse 97; Resp 17; Pulse Ox 96% on R/A;                                    tw2 

12:34  / 91; Pulse 94; Resp 17; Pulse Ox 98% on R/A;                                    tw2 

13:10  / 88; Pulse 91; Resp 17; Pulse Ox 97% on R/A;                                    tw2 

08:42 Body Mass Index 23.75 (86.18 kg, 190.50 cm)                                             tw2 

                                                                                                  

NIH Stroke Scale Scores:                                                                          

10:53 NIHSS Score: 0                                                                          jr8 

                                                                                                  

MDM:                                                                                              

08:43 Patient medically screened.                                                             jr8 

13:17 Data reviewed: vital signs, nurses notes, lab test result(s), EKG, radiologic studies,  jr8 

      CT scan, plain films, ultrasound, and as a result, I will discharge patient. Data           

      interpreted: Pulse oximetry: on room air is 97 %. Interpretation: normal. Counseling: I     

      had a detailed discussion with the patient and/or guardian regarding: the historical        

      points, exam findings, and any diagnostic results supporting the discharge/admit            

      diagnosis, lab results, radiology results, the need for outpatient follow up, a family      

      practitioner, to return to the emergency department if symptoms worsen or persist or if     

      there are any questions or concerns that arise at home. ED course: Patients HR              

      normalized after fluids. Negative DVT on US bilaterally. There was slight DD elevation      

      but patient had IV contrast study already. Patient minimal to no risk factors to have       

      PE based on Wells score. The rest of his studies and labs are essentially normal. Needs     

      to follow up with GI and cardiology for abdominal pain and intermittent shortness of        

      breath .                                                                                    

                                                                                                  

                                                                                             

09:05 Order name: Amylase, Serum; Complete Time: 10:16                                        tw2 

                                                                                             

09:05 Order name: Basic Metabolic Panel; Complete Time: 10:16                                 tw2 

                                                                                             

09:05 Order name: CBC with Diff; Complete Time: 10:04                                         tw2 

                                                                                             

09:05 Order name: Creatinine for Radiology; Complete Time: 10:04                              tw2 

                                                                                             

09:05 Order name: Hepatic Function; Complete Time: 10:16                                                                                                                                   

09:05 Order name: Lipase; Complete Time: 10:16                                                                                                                                             

09:05 Order name: Urine Microscopic Only; Complete Time: 10:16                                                                                                                             

09:50 Order name: Urine Dipstick--Ancillary (enter results); Complete Time: 10:16             mb4 

                                                                                             

11:31 Order name: DD; Complete Time: 12:21                                                                                                                                                 

11:31 Order name: TSH; Complete Time: 12:21                                                                                                                                                

09:05 Order name: IV Saline Lock; Complete Time: 09:05                                                                                                                                     

09:05 Order name: Labs collected and sent; Complete Time: 09:05                                                                                                                            

09:05 Order name: Urine Dipstick-Ancillary (obtain specimen); Complete Time: 09:48                                                                                                         

09:18 Order name: XRAY Chest (1 view); Complete Time: 11:09                                                                                                                                

09:18 Order name: EKG; Complete Time: 09:19                                                                                                                                                

09:18 Order name: Cardiac monitoring; Complete Time: 09:48                                                                                                                                 

09:18 Order name: EKG - Nurse/Tech; Complete Time: 09:48                                                                                                                                   

09:18 Order name: O2 Per Protocol; Complete Time: 09:48                                                                                                                                    

09:18 Order name: O2 Sat Monitoring; Complete Time: 09:48                                                                                                                                  

10:04 Order name: CT Head Brain wo Cont; Complete Time: 11:09                                                                                                                              

10:05 Order name: CT Abd/Pelvis - W/Contrast; Complete Time: 11:09                                                                                                                         

12:37 Order name: US Extremity Venous W Compression Thang; Complete Time: 13:17                 mb4 

                                                                                                  

Administered Medications:                                                                         

11:34 Drug: NS 0.9% 1000 ml Route: IV; Rate: 1000 ml; Site: left antecubital;                 tw2 

13:06 Follow up: Response: No adverse reaction; IV Status: Completed infusion; IV Intake:     tw2 

      1000ml                                                                                      

                                                                                                  

                                                                                                  

Disposition:                                                                                      

18 13:19 Discharged to Home. Impression: Abdominal and pelvic pain, Shortness of breath.    

- Condition is Stable.                                                                            

- Discharge Instructions: Abdominal Pain, Adult, Shortness of Breath.                             

                                                                                                  

- Medication Reconciliation Form, Thank You Letter, Antibiotic Education, Prescription            

  Opioid Use form.                                                                                

- Follow up: Too Huber MD; When: 2 - 3 days; Reason: Recheck today's complaints,             

  Continuance of care, Re-evaluation by your physician.                                           

- Problem is new.                                                                                 

- Symptoms have improved.                                                                         

                                                                                                  

                                                                                                  

                                                                                                  

                                                                                                  

NIH Stroke Scale - NIH Stroke Score                                                               

Date: 2018                                                                                  

Time: 10:53                                                                                       

Total Score = 0                                                                                   

  1a. Level of Consciousness (LOC) - 0(Alert)                                                     

  1b. Level of Consciousness (LOC) (Year \T\ Age) - 0(Both)                                       

  1c. LOC Commands (Open \T\ Closes Eyes/) - 0(Both)                                          

   2. Best Gaze (Lateral Gaze Paresis) - 0(Normal)                                                

   3. Visual Field Loss - 0(No visual loss)                                                       

   4. Facial Palsy - 0(Normal)                                                                    

  5a. Left Arm: Motor (10-second hold) - 0(No drift)                                              

  5b. Right Arm: Motor (10-second hold) - 0(No drift)                                             

  6a. Left Leg: Motor (5-second hold - always test supine) - 0(No drift)                          

  6b. Right Leg: Motor (5-second hold - always test supine) - 0(No drift)                         

   7. Limb Ataxia (finger/nose \T\ heel/shin - test with eyes open) - 0(Absent)                   

   8. Sensory Loss (pinprick arms/legs/face) - 0(Normal)                                          

   9. Best Language: Aphasia (description/naming/reading) - 0(No aphasia)                         

  10. Dysarthria (speech clarity - read or repeat words) - 0(Normal)                              

  11. Extinction and Inattention (visual/tactile/auditory/spatial/personal) - 0(No                

      abnormality)                                                                                

Initials: di                                                                                     

                                                                                                  

Addendum:                                                                                         

2018                                                                                        

     13:49 Co-signature as Attending Physician, Reed Rausch MD I agree with the         [facility]    

           assessment and plan of care.                                                           

                                                                                                  

Signatures:                                                                                       

Dispatcher [employer]                           Reed Person MD MD cha Roszak, Josh, PA PA   jr8                                                  

Shannon Lim RN                          RN   tw2                                                  

                                                                                                  

Corrections: (The following items were deleted from the chart)                                    

                                                                                             

13:34 13:19 2018 13:19 Discharged to Home. Impression: Abdominal and pelvic     tw2         

      pain; Shortness of breath. Condition is Stable. Forms are Medication                        

      Reconciliation Form, Thank You Letter, Antibiotic Education, Prescription                   

      Opioid Use. Follow up: Too Huber; When: 2 - 3 days; Reason: Recheck today's              

      complaints, Continuance of care, Re-evaluation by your physician. Problem is                

      new. Symptoms have improved. jr8                                                            

                                                                                                  

**************************************************************************************************

## 2018-08-04 NOTE — RAD REPORT
EXAM DESCRIPTION:  CT - Head Brain Wo Cont - 8/4/2018 10:34 am

 

CLINICAL HISTORY:  WEAKNESS

CVA

 

COMPARISON:  Head Brain Wo Cont dated 6/18/2016

 

TECHNIQUE:  All CT scans are performed using dose optimization technique as appropriate and may inclu
de automated exposure control or mA/KV adjustment according to patient size.

 

FINDINGS:  No intracranial hemorrhage, hydrocephalus or extra-axial fluid collection.No areas of brai
n edema or evidence of midline shift.

 

The paranasal sinuses and mastoids are clear. The calvarium is intact.

 

IMPRESSION:  No acute intracranial abnormality.

## 2018-08-05 NOTE — EKG
Test Date:    2018-08-04               Test Time:    08:58:17

Technician:   LETY                                     

                                                     

MEASUREMENT RESULTS:                                       

Intervals:                                           

Rate:         102                                    

MT:           144                                    

QRSD:         94                                     

QT:           344                                    

QTc:          448                                    

Axis:                                                

P:            59                                     

MT:           144                                    

QRS:          75                                     

T:            57                                     

                                                     

INTERPRETIVE STATEMENTS:                                       

                                                     

Sinus tachycardia

Otherwise normal ECG

Compared to ECG 05/08/2018 10:37:59

Sinus rhythm no longer present



Electronically Signed On 08-05-18 10:43:21 CDT by Too Huber

## 2019-10-02 ENCOUNTER — HOSPITAL ENCOUNTER (EMERGENCY)
Dept: HOSPITAL 97 - ER | Age: 59
Discharge: HOME | End: 2019-10-02
Payer: SELF-PAY

## 2019-10-02 VITALS — DIASTOLIC BLOOD PRESSURE: 79 MMHG | SYSTOLIC BLOOD PRESSURE: 177 MMHG

## 2019-10-02 VITALS — OXYGEN SATURATION: 99 %

## 2019-10-02 VITALS — TEMPERATURE: 98.7 F

## 2019-10-02 DIAGNOSIS — Z72.0: ICD-10-CM

## 2019-10-02 DIAGNOSIS — R07.9: Primary | ICD-10-CM

## 2019-10-02 DIAGNOSIS — I10: ICD-10-CM

## 2019-10-02 LAB
ALBUMIN SERPL BCP-MCNC: 4 G/DL (ref 3.4–5)
ALP SERPL-CCNC: 191 U/L (ref 45–117)
ALT SERPL W P-5'-P-CCNC: 56 U/L (ref 12–78)
AST SERPL W P-5'-P-CCNC: 51 U/L (ref 15–37)
BUN BLD-MCNC: 12 MG/DL (ref 7–18)
GLUCOSE SERPLBLD-MCNC: 111 MG/DL (ref 74–106)
HCT VFR BLD CALC: 46.3 % (ref 39.6–49)
LIPASE SERPL-CCNC: 141 U/L (ref 73–393)
LYMPHOCYTES # SPEC AUTO: 1 K/UL (ref 0.7–4.9)
PMV BLD: 7.4 FL (ref 7.6–11.3)
POTASSIUM SERPL-SCNC: 3.9 MMOL/L (ref 3.5–5.1)
RBC # BLD: 5.43 M/UL (ref 4.33–5.43)
TROPONIN (EMERG DEPT USE ONLY): < 0.02 NG/ML (ref 0–0.04)

## 2019-10-02 PROCEDURE — 93005 ELECTROCARDIOGRAM TRACING: CPT

## 2019-10-02 PROCEDURE — 80048 BASIC METABOLIC PNL TOTAL CA: CPT

## 2019-10-02 PROCEDURE — 71045 X-RAY EXAM CHEST 1 VIEW: CPT

## 2019-10-02 PROCEDURE — 71275 CT ANGIOGRAPHY CHEST: CPT

## 2019-10-02 PROCEDURE — 74175 CTA ABDOMEN W/CONTRAST: CPT

## 2019-10-02 PROCEDURE — 83690 ASSAY OF LIPASE: CPT

## 2019-10-02 PROCEDURE — 85025 COMPLETE CBC W/AUTO DIFF WBC: CPT

## 2019-10-02 PROCEDURE — 80076 HEPATIC FUNCTION PANEL: CPT

## 2019-10-02 PROCEDURE — 36415 COLL VENOUS BLD VENIPUNCTURE: CPT

## 2019-10-02 PROCEDURE — 99285 EMERGENCY DEPT VISIT HI MDM: CPT

## 2019-10-02 PROCEDURE — 84484 ASSAY OF TROPONIN QUANT: CPT

## 2019-10-02 NOTE — ER
Nurse's Notes                                                                                     

 [facility]                                                                 

Name: Nitin Ricci                                                                             

Age: 59 yrs                                                                                       

Sex: Male                                                                                         

: 1960                                                                                   

MRN: [id number]                                                                                   

Arrival Date: 10/02/2019                                                                          

Time: 08:43                                                                                       

Account#: [id number]                                                                            

Bed 20                                                                                            

Private MD:                                                                                       

Diagnosis: Chest pain, unspecified                                                                

                                                                                                  

Presentation:                                                                                     

10/02                                                                                             

08:43 Presenting complaint: Patient states: left upper wall chest pain and epigastric pain x  sv  

      1 day. Transition of care: [facility] custody. Onset of symptoms was 2019. Risk        

      Assessment: Do you want to hurt yourself or someone else? Patient reports no desire to      

      harm self or others. Initial Sepsis Screen: Does the patient meet any 2 criteria? No.       

      Patient's initial sepsis screen is negative. Does the patient have a suspected source       

      of infection? No. Patient's initial sepsis screen is negative. Care prior to arrival:       

      None.                                                                                       

08:43 Method Of Arrival: Ambulatory                                                           sv  

08:43 Acuity: PREETI 3                                                                           sv  

                                                                                                  

Triage Assessment:                                                                                

08:43 General: Appears in no apparent distress. uncomfortable, well developed, Behavior is    sv  

      calm, cooperative, appropriate for age. Pain: Complains of pain in anterior aspect of       

      left upper chest and epigastric area Pain currently is 5 out of 10 on a pain scale.         

      Pain began 1 day ago. Is intermittent. Neuro: Level of Consciousness is awake, alert,       

      obeys commands, Oriented to person, place, time, situation, Moves all extremities. Full     

      function Gait is steady. Cardiovascular: Patient's skin is warm and dry. Rhythm is          

      sinus rhythm. Respiratory: Airway is patent Respiratory effort is even, unlabored,          

      Respiratory pattern is regular, symmetrical. GI: Reports epigastric pain. Derm: Skin is     

      pink, warm \T\ dry.                                                                         

                                                                                                  

Historical:                                                                                       

- Allergies:                                                                                      

08:45 No Known Drug Allergies;                                                                sv  

- PMHx:                                                                                           

08:45 Hypertension;                                                                           sv  

- PSHx:                                                                                           

08:45 Cholecystectomy; Appendectomy;                                                          sv  

                                                                                                  

- Immunization history:: Adult Immunizations up to date.                                          

- Social history:: Smoking status: Patient uses tobacco products.                                 

- Family history:: not pertinent.                                                                 

- Ebola Screening: : No symptoms or risks identified at this time.                                

- Hospitalizations: : No recent hospitalization is reported.                                      

                                                                                                  

                                                                                                  

Screenin:45 Abuse screen: Denies threats or abuse. Denies injuries from another. Nutritional        sv  

      screening: No deficits noted. Tuberculosis screening: No symptoms or risk factors           

      identified. Fall Risk None identified.                                                      

                                                                                                  

Assessment:                                                                                       

08:54 Reassessment: Patient appears in no apparent distress at this time. No changes from     sv  

      previously documented assessment. Patient and/or family updated on plan of care and         

      expected duration. Pain level reassessed. Patient is alert, oriented x 3, equal             

      unlabored respirations, skin warm/dry/pink.                                                 

09:47 Reassessment: Was in pt's room to discharge him. Discontinued IV and cardiac            sv  

      monitoring; turned around to face the cardiac monitor to replace wires. Pt was then on      

      the ground laying, stated "I don't know what happened. I know there's something wrong       

      with me." PD at bedside stated he was sitting down on the bed and stood up and slid to      

      the ground. Pt stated "It felt like I just went to sleep." No head injury or other          

      injuries noted. Informed Dr Mazariegos.                                                          

10:00 Reassessment: Patient appears in no apparent distress at this time. Patient and/or      sv  

      family updated on plan of care and expected duration. Pain level reassessed. Patient is     

      alert, oriented x 3, equal unlabored respirations, skin warm/dry/pink. Pt asked not to      

      get up out of bed for his safety.                                                           

10:17 Reassessment: Patient appears in no apparent distress at this time. Patient and/or      sv  

      family updated on plan of care and expected duration. Pain level reassessed. Patient is     

      alert, oriented x 3, equal unlabored respirations, skin warm/dry/pink.                      

11:00 Reassessment: Pt noted to be up out of bed and ambulating to the bathroom with no       sv  

      difficulty.                                                                                 

11:53 Reassessment: Patient appears in no apparent distress at this time. No changes from     sv  

      previously documented assessment. Patient and/or family updated on plan of care and         

      expected duration. Pain level reassessed. Patient is alert, oriented x 3, equal             

      unlabored respirations, skin warm/dry/pink.                                                 

                                                                                                  

Vital Signs:                                                                                      

08:44  / 95; Pulse 98; Resp 17; Temp 98.7(O); Pulse Ox 95% ; Weight 86.18 kg; Height 6  sv  

      ft. 4 in. (193.04 cm); Pain 5/10;                                                           

09:36  / 85; Pulse 87; Resp 16; Pulse Ox 99% ;                                          sv  

10:19  / 91; Pulse 96; Resp 14; Pulse Ox 96% on R/A;                                    sv  

11:08  / 86; Pulse 85; Resp 15; Pulse Ox 99% ;                                          sv  

11:29  / 79; Pulse 85; Resp 16; Pulse Ox 99% ;                                          sv  

08:44 Body Mass Index 23.13 (86.18 kg, 193.04 cm)                                             sv  

                                                                                                  

ED Course:                                                                                        

08:43 Patient arrived in ED.                                                                  sv  

08:43 Bambi Perkins, RN is Primary Nurse.                                                  sv  

08:44 Triage completed.                                                                       sv  

08:45 Arm band placed on.                                                                     sv  

08:45 Patient has correct armband on for positive identification. Bed in low position. Call   sv  

      light in reach. [employer] at bedside. Cardiac monitor on. Pulse ox on. NIBP on. Door closed.     

      Head of bed elevated.                                                                       

08:46 Rudolph Mazariegos MD is Attending Physician.                                                rn  

08:46 ED physician to see patient.                                                            sv  

08:50 Inserted saline lock: 20 gauge in right antecubital area, using aseptic technique.      sv  

      Blood collected. Flushed right antecubital with 5 ml normal saline.                         

08:54 Awaiting lab results.                                                                   sv  

09:00 Patient maintains SpO2 saturation greater than 95% on room air.                         sv  

09:27 XRAY Chest (1 view) In Process Unspecified.                                             EDMS

10:00 Inserted saline lock: 20 gauge in left antecubital area, using aseptic technique.       sv  

      Flushed left antecubital with 5 ml normal saline.                                           

10:06 Patient moved to CT via wheelchair.                                                     sv  

10:16 CT Aorta for Dissection In Process Unspecified.                                         EDMS

10:17 Patient moved back from CT.                                                             sv  

10:18 Awaiting radiology results.                                                             sv  

10:37 Repeat lab(s) drawn. by me, sent to lab.                                                sv  

11:53 No provider procedures requiring assistance completed. IV discontinued, intact,         sv  

      bleeding controlled, No redness/swelling at site. Pressure dressing applied.                

                                                                                                  

Administered Medications:                                                                         

10:03 Drug: cloNIDine 0.2 mg Route: PO;                                                       sv  

10:17 Follow up: Response: No adverse reaction                                                sv  

                                                                                                  

                                                                                                  

Outcome:                                                                                          

09:41 Discharge ordered by MD.                                                                rn  

11:31 Discharge ordered by MD.                                                                rn  

11:53 Discharged to home ambulatory, with [employer]                                                sv  

11:53 Condition: stable                                                                           

11:53 Discharge instructions given to patient, Instructed on discharge instructions, follow       

      up and referral plans. Demonstrated understanding of instructions, follow-up care.          

11:54 Patient left the ED.                                                                    sv  

                                                                                                  

Signatures:                                                                                       

Dispatcher [employer]                           Bambi Lee RN                    RN   sv                                                   

Rudolph Mazariegos MD MD   rn                                                   

                                                                                                  

Corrections: (The following items were deleted from the chart)                                    

10:20 10:19 Pulse 96bpm; Resp 14bpm; Pulse Ox 96% RA; sv                                      sv  

                                                                                                  

**************************************************************************************************

## 2019-10-02 NOTE — EKG
Test Date:    2019-10-02               Test Time:    08:52:10

Technician:   ESTEE                                     

                                                     

MEASUREMENT RESULTS:                                       

Intervals:                                           

Rate:         89                                     

TN:           138                                    

QRSD:         94                                     

QT:           358                                    

QTc:          435                                    

Axis:                                                

P:            63                                     

TN:           138                                    

QRS:          73                                     

T:            62                                     

                                                     

INTERPRETIVE STATEMENTS:                                       

                                                     

Normal sinus rhythm

Minimal voltage criteria for LVH, may be normal variant

Borderline ECG

Compared to ECG 08/04/2018 08:58:17

Left ventricular hypertrophy now present

Sinus tachycardia no longer present



Electronically Signed On 10-02-19 11:36:06 CDT by Too Huber

## 2019-10-02 NOTE — RAD REPORT
EXAM DESCRIPTION:  Prashant Single View10/2/2019 9:27 am

 

CLINICAL HISTORY:  Chest pain

 

COMPARISON:  2013

 

FINDINGS:   The lungs appear clear of acute infiltrate. The heart is normal size

 

IMPRESSION:   No acute abnormalities displayed

## 2019-10-02 NOTE — RAD REPORT
EXAM DESCRIPTION:  CT - Angio  Aorta For Dissection - 10/2/2019 10:14 am

 

CLINICAL HISTORY:  Chest pain radiating to the back.

chest pain, hypertensive, near syncope

 

COMPARISON:  No comparisons

 

TECHNIQUE:  CT angiography of the aorta was performed with MIPs.

 

All CT scans are performed using dose optimization technique as appropriate and may include automated
 exposure control or mA/KV adjustment according to patient size.

 

FINDINGS:  A left aortic arch is present with normal branching pattern of the great vessels.No acute 
aortic finding is seen such as aneurysm, penetrating ulcer or dissection.  The celiac axis, SMA, LUH 
and renal arteries are patent. Mild atheromatous plaquing is seen at the origin of both renal arterie
s, greater on the right.

 

No evidence of pulmonary embolism.

 

Lungs are mildly emphysematous but clear of acute infiltrate.

 

The liver demonstrates no focal mass or biliary dilatation.Cholecystectomy clips.The spleen, pancreas
, adrenal glands and kidneys are within normal limits for arterial phase imaging.

 

No bowel obstruction, free fluid or abscess.No pathologic enlarged lymphadenopathy identified.

 

No fracture or worrisome bone lesion seen.

 

 

IMPRESSION:  No acute aortic finding is demonstrated.

## 2019-10-02 NOTE — EDPHYS
Physician Documentation                                                                           

 [facility]                                                                 

Name: Nitin Ricci                                                                             

Age: 59 yrs                                                                                       

Sex: Male                                                                                         

: 1960                                                                                   

MRN: [id number]                                                                                   

Arrival Date: 10/02/2019                                                                          

Time: 08:43                                                                                       

Account#: [id number]                                                                            

Bed 20                                                                                            

Private MD:                                                                                       

ED Physician Rudolph Mazariegos                                                                         

HPI:                                                                                              

10/02                                                                                             

08:53 This 59 yrs old  Male presents to ER via Ambulatory with complaints of Chest   rn  

      Pain, Epigastric Pain.                                                                      

08:53 The patient or guardian reports chest pain that is located primarily in the substernal  rn  

      area, epigastric area. Onset: last night. The pain does not radiate. Associated signs       

      and symptoms: Pertinent positives: abdominal pain, cough, Pertinent negatives:              

      diaphoresis, dizziness, headache, lower extremity pain, lower extremity swelling,           

      lightheadedness, palpitations, recent travel, shortness of breath, syncope, vomiting.       

      The chest pain is described as cramping. Duration: The patient or guardian reports          

      multiple episodes, the episodes last approximately 15 second(s). Modifying factors: The     

      symptoms are alleviated by nothing. the symptoms are aggravated by nothing. Severity of     

      pain: At its worst the pain was mild in the emergency department the pain is unchanged.     

      The patient has not experienced similar symptoms in the past. The patient has not           

      recently seen a physician. Patient arrested, brought in by police, states chest pain        

      and upper abd pain since last night. NO hx of MI, + several episodes of colitis in          

      past. Reports chest pain feels like chest muscles cramp/spasm, lasts 10-15 seconds, and     

      non-radiating. Also reports nausea. .                                                       

                                                                                                  

Historical:                                                                                       

- Allergies:                                                                                      

08:45 No Known Drug Allergies;                                                                sv  

- PMHx:                                                                                           

08:45 Hypertension;                                                                           sv  

- PSHx:                                                                                           

08:45 Cholecystectomy; Appendectomy;                                                          sv  

                                                                                                  

- Immunization history:: Adult Immunizations up to date.                                          

- Social history:: Smoking status: Patient uses tobacco products.                                 

- Family history:: not pertinent.                                                                 

- Ebola Screening: : No symptoms or risks identified at this time.                                

- Hospitalizations: : No recent hospitalization is reported.                                      

                                                                                                  

                                                                                                  

ROS:                                                                                              

08:53 Constitutional: Negative for fever, chills, and weight loss, Eyes: Negative for injury, rn  

      pain, redness, and discharge, Neck: Negative for injury, pain, and swelling,                

      Cardiovascular: Negative for palpitations, and edema, Respiratory: Negative for             

      shortness of breath, wheezing, and pleuritic chest pain, Abdomen/GI: Negative for           

      vomiting, diarrhea, and constipation, MS/Extremity: Negative for injury and deformity,      

      Skin: Negative for injury, rash, and discoloration, Neuro: Negative for headache,           

      weakness, numbness, tingling, and seizure.                                                  

                                                                                                  

Exam:                                                                                             

08:53 Constitutional:  This is a well developed, well nourished patient who is awake, alert,  rn  

      and in no acute distress.  Ambulatory to room without difficulty or distress Head/Face:     

       Normocephalic, atraumatic. Eyes:  Pupils equal round and reactive to light,                

      extra-ocular motions intact.  Lids and lashes normal.  Conjunctiva and sclera are           

      non-icteric and not injected.  Cornea within normal limits.  Periorbital areas with no      

      swelling, redness, or edema. ENT:  MMM Cardiovascular:  Regular rate and rhythm.  No        

      pulse deficits. Respiratory:  Lungs have equal breath sounds bilaterally, clear to          

      auscultation.  No increased work of breathing, no retractions or nasal flaring.             

      Abdomen/GI:  soft, mild epigastric tenderness Skin:  Warm, dry with normal turgor.          

      Normal color with no rashes, no lesions, and no evidence of cellulitis. MS/ Extremity:      

      Pulses equal, no cyanosis.  Neurovascular intact.  Full, normal range of motion.  Equal     

      circumference. Neuro:  Awake and alert, GCS 15, oriented to person, place, time, and        

      situation.  Cranial nerves II-XII grossly intact.  Motor strength 5/5 in all                

      extremities.  Sensory grossly intact.  Cerebellar exam normal.  Normal gait.                

09:08 ECG was reviewed by the Attending Physician.                                            rn  

                                                                                                  

Vital Signs:                                                                                      

08:44  / 95; Pulse 98; Resp 17; Temp 98.7(O); Pulse Ox 95% ; Weight 86.18 kg; Height 6  sv  

      ft. 4 in. (193.04 cm); Pain 5/10;                                                           

09:36  / 85; Pulse 87; Resp 16; Pulse Ox 99% ;                                          sv  

10:19  / 91; Pulse 96; Resp 14; Pulse Ox 96% on R/A;                                    sv  

11:08  / 86; Pulse 85; Resp 15; Pulse Ox 99% ;                                          sv  

11:29  / 79; Pulse 85; Resp 16; Pulse Ox 99% ;                                          sv  

08:44 Body Mass Index 23.13 (86.18 kg, 193.04 cm)                                             sv  

                                                                                                  

MDM:                                                                                              

08:46 Patient medically screened.                                                             rn  

09:40 Differential diagnosis: acute myocardial infarction, acute pericarditis, anxiety,       rn  

      coronary artery disease chest wall pain, costochondritis, esophagitis, gastritis,           

      gastroesophageal reflux disease (GERD), pancreatitis, pneumonia, pneumothorax. Data         

      reviewed: vital signs, nurses notes, lab test result(s), EKG, radiologic studies, plain     

      films, and as a result, I will discharge patient. Test interpretation: by ED physician      

      or midlevel provider: ECG, plain radiologic studies, CXR negative for acute infiltrate      

      or pneumonia/pneumothorax. Counseling: I had a detailed discussion with the patient         

      and/or guardian regarding: the historical points, exam findings, and any diagnostic         

      results supporting the discharge/admit diagnosis, lab results, radiology results, the       

      need for outpatient follow up, to return to the emergency department if symptoms worsen     

      or persist or if there are any questions or concerns that arise at home. Special            

      discussion: I discussed with the patient/guardian in detail that at this point there is     

      no indication for admission to the hospital. It is understood, however, that if the         

      symptoms persist or worsen the patient needs to return immediately for re-evaluation.       

10:39 ED course: Patient was being discharged, then went down to ground, states thinks passed rn  

      out but witnesses including ER tech did not see syncopal episode or seizure. CT aorta       

      added and negative for acute findings. Patient still worried of intermittent chest          

      pain, told him not classic for cardiac but will get repeat trop. If normal dc home.         

      Patient very hesitant leaving and seems to complain of more things and is inconsistent      

      with onset and type of symptoms. Not sure if trying to get out of skilled nursing, but full workup     

      obtained and negative so far. Given return precautions..                                    

11:30 Special discussion: I have referred the patient to see his PCP for further evaluation   rn  

      of high blood pressure.                                                                     

                                                                                                  

10/02                                                                                             

08:47 Order name: Basic Metabolic Panel; Complete Time: 09:39                                 rn  

10/02                                                                                             

08:47 Order name: CBC with Diff; Complete Time: 09:39                                         rn  

10/02                                                                                             

08:47 Order name: LFT's; Complete Time: 09:39                                                 rn  

10/02                                                                                             

08:47 Order name: Troponin (emerg Dept Use Only); Complete Time: 09:39                        rn  

10/02                                                                                             

08:47 Order name: Lipase; Complete Time: 09:39                                                rn  

10/02                                                                                             

10:34 Order name: Troponin (emerg Dept Use Only); Complete Time: 11:30                        sv  

10/02                                                                                             

08:47 Order name: XRAY Chest (1 view); Complete Time: 09:42                                   rn  

10/02                                                                                             

08:47 Order name: EKG; Complete Time: 08:48                                                   rn  

10/02                                                                                             

08:47 Order name: Cardiac monitoring; Complete Time: 08:54                                    rn  

10/02                                                                                             

08:47 Order name: EKG - Nurse/Tech; Complete Time: 08:54                                      rn  

10/02                                                                                             

08:47 Order name: IV Saline Lock; Complete Time: 08:54                                        rn  

10/02                                                                                             

08:47 Order name: Labs collected and sent; Complete Time: 08:54                               rn  

10/02                                                                                             

09:55 Order name: CT Aorta for Dissection; Complete Time: 10:25                               rn  

10/02                                                                                             

08:47 Order name: O2 Per Protocol; Complete Time: 08:54                                       rn  

10/02                                                                                             

08:47 Order name: O2 Sat Monitoring; Complete Time: 09:07                                     rn  

                                                                                                  

EC:08 Rate is 89 beats/min. Rhythm is regular. QRS Axis is Normal. WV interval is normal. QRS rn  

      interval is normal. QT interval is normal. No Q waves. T waves are Normal. No ST            

      changes noted. Clinical impression: Normal ECG. Interpreted by me. Reviewed by me.          

                                                                                                  

Administered Medications:                                                                         

10:03 Drug: cloNIDine 0.2 mg Route: PO;                                                       sv  

10:17 Follow up: Response: No adverse reaction                                                sv  

                                                                                                  

                                                                                                  

Disposition:                                                                                      

10/02/19 11:31 Discharged to Home. Impression: Chest pain, unspecified.                           

- Condition is Stable.                                                                            

- Discharge Instructions: Nonspecific Chest Pain, Hypertension, Pain Without a Known              

  Cause.                                                                                          

                                                                                                  

- Medication Reconciliation Form, Thank You Letter, Antibiotic Education, Prescription            

  Opioid Use form.                                                                                

- Follow up: Private Physician; When: As needed; Reason: Recheck today's complaints,              

  Re-evaluation by your physician.                                                                

- Problem is new.                                                                                 

- Symptoms have improved.                                                                         

                                                                                                  

                                                                                                  

                                                                                                  

Signatures:                                                                                       

Dispatcher [employer]                           Bambi Lee RN                    RN   Rudolph Mason MD MD   rn                                                   

                                                                                                  

Corrections: (The following items were deleted from the chart)                                    

08:56 08:53 Constitutional: Negative for fever, chills, and weight loss, Eyes: Negative for   rn  

      injury, pain, redness, and discharge, Neck: Negative for injury, pain, and swelling,        

      Cardiovascular: Negative for palpitations, and edema, Respiratory: Negative for             

      shortness of breath, cough, wheezing, and pleuritic chest pain, Abdomen/GI: Negative        

      for vomiting, diarrhea, and constipation, MS/Extremity: Negative for injury and             

      deformity, Skin: Negative for injury, rash, and discoloration, Neuro: Negative for          

      headache, weakness, numbness, tingling, and seizure, rn                                     

09:55 09:41 10/02/2019 09:41 Discharged to Home. Impression: Chest pain, unspecified.         rn  

      Condition is Stable. Forms are Medication Reconciliation Form, Thank You Letter,            

      Antibiotic Education, Prescription Opioid Use. Follow up: Private Physician; When: As       

      needed; Reason: Recheck today's complaints, Re-evaluation by your physician. Problem is     

      new. Symptoms have improved. rn                                                             

11:54 11:31 10/02/2019 11:31 Discharged to Home. Impression: Chest pain, unspecified.         sv  

      Condition is Stable. Forms are Medication Reconciliation Form, Thank You Letter,            

      Antibiotic Education, Prescription Opioid Use. Follow up: Private Physician; When: As       

      needed; Reason: Recheck today's complaints, Re-evaluation by your physician. Problem is     

      new. Symptoms have improved. rn                                                             

                                                                                                  

**************************************************************************************************

## 2020-06-19 ENCOUNTER — HOSPITAL ENCOUNTER (EMERGENCY)
Dept: HOSPITAL 97 - ER | Age: 60
Discharge: HOME | End: 2020-06-19
Payer: SELF-PAY

## 2020-06-19 VITALS — TEMPERATURE: 98.3 F

## 2020-06-19 VITALS — DIASTOLIC BLOOD PRESSURE: 56 MMHG | OXYGEN SATURATION: 93 % | SYSTOLIC BLOOD PRESSURE: 103 MMHG

## 2020-06-19 DIAGNOSIS — R41.82: Primary | ICD-10-CM

## 2020-06-19 DIAGNOSIS — F10.129: ICD-10-CM

## 2020-06-19 LAB
ALBUMIN SERPL BCP-MCNC: 3.8 G/DL (ref 3.4–5)
ALP SERPL-CCNC: 187 U/L (ref 45–117)
ALT SERPL W P-5'-P-CCNC: 43 U/L (ref 12–78)
AST SERPL W P-5'-P-CCNC: 24 U/L (ref 15–37)
BUN BLD-MCNC: 20 MG/DL (ref 7–18)
GLUCOSE SERPLBLD-MCNC: 111 MG/DL (ref 74–106)
HCT VFR BLD CALC: 40.6 % (ref 39.6–49)
INR BLD: 1.05
LYMPHOCYTES # SPEC AUTO: 1.9 K/UL (ref 0.7–4.9)
MAGNESIUM SERPL-MCNC: 2.3 MG/DL (ref 1.8–2.4)
NT-PROBNP SERPL-MCNC: 69 PG/ML (ref ?–125)
PMV BLD: 8.3 FL (ref 7.6–11.3)
POTASSIUM SERPL-SCNC: 3.8 MMOL/L (ref 3.5–5.1)
RBC # BLD: 4.77 M/UL (ref 4.33–5.43)
TROPONIN (EMERG DEPT USE ONLY): < 0.02 NG/ML (ref 0–0.04)

## 2020-06-19 PROCEDURE — 84484 ASSAY OF TROPONIN QUANT: CPT

## 2020-06-19 PROCEDURE — 70450 CT HEAD/BRAIN W/O DYE: CPT

## 2020-06-19 PROCEDURE — 83880 ASSAY OF NATRIURETIC PEPTIDE: CPT

## 2020-06-19 PROCEDURE — 80048 BASIC METABOLIC PNL TOTAL CA: CPT

## 2020-06-19 PROCEDURE — 85610 PROTHROMBIN TIME: CPT

## 2020-06-19 PROCEDURE — 71045 X-RAY EXAM CHEST 1 VIEW: CPT

## 2020-06-19 PROCEDURE — 99284 EMERGENCY DEPT VISIT MOD MDM: CPT

## 2020-06-19 PROCEDURE — 83735 ASSAY OF MAGNESIUM: CPT

## 2020-06-19 PROCEDURE — 80320 DRUG SCREEN QUANTALCOHOLS: CPT

## 2020-06-19 PROCEDURE — 85025 COMPLETE CBC W/AUTO DIFF WBC: CPT

## 2020-06-19 PROCEDURE — 80076 HEPATIC FUNCTION PANEL: CPT

## 2020-06-19 PROCEDURE — 36415 COLL VENOUS BLD VENIPUNCTURE: CPT

## 2020-06-19 PROCEDURE — 93005 ELECTROCARDIOGRAM TRACING: CPT

## 2020-06-19 NOTE — RAD REPORT
EXAM DESCRIPTION:  CT - Head Brain Wo Cont - 6/19/2020 3:54 pm

 

CLINICAL HISTORY:  CONFUSED

Headache, drowsiness

 

COMPARISON:  Head Brain Wo Cont dated 8/4/2018; Head Brain Wo Cont dated 6/18/2016

 

TECHNIQUE:  All CT scans are performed using dose optimization technique as appropriate and may inclu
de automated exposure control or mA/KV adjustment according to patient size.

 

FINDINGS:  No intracranial hemorrhage, hydrocephalus or extra-axial fluid collection.No areas of brai
n edema or evidence of midline shift.

 

The paranasal sinuses and mastoids are clear. The calvarium is intact.

 

IMPRESSION:  No acute intracranial abnormality.

## 2020-06-19 NOTE — ER
Nurse's Notes                                                                                     

 [facility]                                                                 

Name: Nitin Ricci                                                                             

Age: 59 yrs                                                                                       

Sex: Male                                                                                         

: 1960                                                                                   

MRN: [id number]                                                                                   

Arrival Date: 2020                                                                          

Time: 14:51                                                                                       

Account#: [id number]                                                                            

Bed 3                                                                                             

Private MD:                                                                                       

Diagnosis: Alcohol intoxication;Altered mental status, unspecified                                

                                                                                                  

Presentation:                                                                                     

                                                                                             

14:52 Chief complaint: EMS states: Pt's boss called EMS for altered mental status, reports    ph  

      that he spoke to pt at 1230 and he seemed "normal", went by to check on him at approx       

      1315 and found him passed out under a vehicle he had been working on, pt smelled of         

      ETOH and an unlabeled bottle of clear fluid on scene smelled of ETOH but pt denies          

      drinking, pt drowsy w/ slurred speech upon arrival, reports R sided chest pain.             

      Coronavirus screen: Patient denies a cough. Patient denies shortness of breath or           

      difficulty breathing. Patient denies measured and/or subjective temperature greater         

      than 100.4F prior to today's visit. Patient denies travel on a cruise ship or to a          

      country the [facility] currently lists as an affected area. Patient denies contact with known      

      and/or suspected case of COVID-19. Ebola Screen: No symptoms or risks identified at         

      this time. Initial Sepsis Screen: Does the patient meet any 2 criteria? No. Patient's       

      initial sepsis screen is negative. Does the patient have a suspected source of              

      infection? No. Patient's initial sepsis screen is negative. Risk Assessment: Do you         

      want to hurt yourself or someone else? Patient reports no desire to harm self or            

      others. Onset of symptoms was 2020.                                                

14:52 Method Of Arrival: EMS: [facility]  

14:52 Acuity: PREETI 2                                                                           ph  

                                                                                                  

Historical:                                                                                       

- Allergies:                                                                                      

15:00 No Known Drug Allergies;                                                                ph  

- PMHx:                                                                                           

15:00 Hypertension;                                                                           ph  

- PSHx:                                                                                           

15:00 Cholecystectomy; Appendectomy;                                                          ph  

                                                                                                  

- Immunization history:: Adult Immunizations unknown.                                             

                                                                                                  

                                                                                                  

Screening:                                                                                        

15:56 Abuse screen: Denies threats or abuse. Nutritional screening: No deficits noted.        em  

      Tuberculosis screening: No symptoms or risk factors identified. Fall Risk None              

      identified.                                                                                 

                                                                                                  

Assessment:                                                                                       

16:08 General: Appears in no apparent distress. comfortable, Behavior is cooperative, drowsy, ph  

      Smells of alcohol. Pain: Complains of pain in anterior aspect of left upper chest           

      Quality of pain is described as burning. Neuro: Level of Consciousness is obeys             

      commands, lethargic, Oriented to person, place, time,  are equal bilaterally Moves     

      all extremities. Speech is slurred, Facial symmetry appears normal, Facial symmetry:        

      tongue is midline, Pupils are PERRLA, Intact. Cardiovascular: Reports chest pain,           

      Denies nausea, palpitations, shortness of breath, Capillary refill < 3 seconds in           

      bilateral fingers Patient's skin is warm and dry. Chest pain quality is burning, is         

      located in left anterior chest wall. Respiratory: Airway is patent Respiratory effort       

      is even, unlabored. GI: No signs and/or symptoms were reported involving the                

      gastrointestinal system. Derm: Skin is intact, Skin is pink, warm \T\ dry.                  

      Musculoskeletal: Circulation, motion, and sensation intact. Range of motion: intact in      

      all extremities.                                                                            

17:10 Reassessment: pt states he wants to leave and go smoke, pt states he will get a friend  em  

      to call, pt left before friend arrived, [employer] notified.                                      

                                                                                                  

Vital Signs:                                                                                      

14:52  / 58; Pulse 77; Resp 18; Temp 98.3; Pulse Ox 97% on R/A; Weight 104.33 kg;       ph  

16:14  / 56; Pulse 72; Resp 18; Pulse Ox 93% on R/A;                                    ph  

16:14 pt asleep                                                                               ph  

                                                                                                  

ED Course:                                                                                        

14:51 Patient arrived in ED.                                                                  ph  

14:52 Jerardo Pearce MD is Attending Physician.                                              kdr 

14:59 Triage completed.                                                                       ph  

15:00 Arm band placed on Patient placed in an exam room, on a stretcher.                      ph  

15:03 Trace Perez, RN is Primary Nurse.                                                      em  

15:16 XRAY Chest (1 view) In Process Unspecified.                                             EDMS

15:33 Initial lab(s) drawn, by me, sent to lab.                                               dh3 

15:50 EKG done, by ED staff, reviewed by Jerardo Pearce MD.                                    dh3 

15:54 CT Head Brain wo Cont In Process Unspecified.                                           EDMS

15:57 Patient has correct armband on for positive identification. Bed in low position. Call   em  

      light in reach. Pulse ox on. NIBP on.                                                       

16:10 CBC with Diff Sent.                                                                     dm5 

16:10 Basic Metabolic Panel Sent.                                                             dm5 

17:37 No provider procedures requiring assistance completed. IV discontinued, intact,         em  

      bleeding controlled, No redness/swelling at site. Pressure dressing applied.                

                                                                                                  

Administered Medications:                                                                         

No medications were administered                                                                  

                                                                                                  

                                                                                                  

Outcome:                                                                                          

16:40 Discharge ordered by MD.                                                                tom 

17:15 Eloped from patient exam room, after seeing physician Time discovered patient gone:     em  

      2020 at 17:15                                                                      

17:15 Condition: stable                                                                           

17:38 Patient left the ED.                                                                    em  

                                                                                                  

Signatures:                                                                                       

Dispatcher [employer]                           Corinne Caceres, RN                    RN   dm5                                                  

Jerardo Pearce MD MD kdr Munoz, Edgar, RN                        RN   em                                                   

Maria M Potter RN                      RN                                                      

Jesus, Tami                              [facility]                                                  

                                                                                                  

**************************************************************************************************

## 2020-06-19 NOTE — EDPHYS
Physician Documentation                                                                           

 [facility]                                                                 

Name: Nitin Ricci                                                                             

Age: 59 yrs                                                                                       

Sex: Male                                                                                         

: 1960                                                                                   

MRN: [id number]                                                                                   

Arrival Date: 2020                                                                          

Time: 14:51                                                                                       

Account#: [id number]                                                                            

Bed 3                                                                                             

Private MD:                                                                                       

ED Physician Jerardo Pearce                                                                       

HPI:                                                                                              

                                                                                             

07:49 This 59 yrs old  Male presents to ER via EMS with complaints of Altered Mental kdr 

      Status.                                                                                     

07:49 The patient presents with decreased responsiveness. Onset: The symptoms/episode         kdr 

      began/occurred suddenly, just prior to arrival. Possible causes: CVA or TIA, drug use,      

      alcohol, head injury. Associated signs and symptoms: The patient has no apparent            

      associated signs or symptoms. Current symptoms: In the emergency department the             

      patient's symptoms have improved, moderately. Patient's baseline: Neuro: alert and          

      fully oriented, Motor: no deficits, Ambulation: walks without assistance, Speech:. It       

      is unknown whether or not the patient has had similar symptoms in the past. It is           

      unknown whether or not the patient has recently seen a physician. The patient was found     

      passed out on the ground under a vehicle he had been working on. There was no evidence      

      or suspicion of trauma.                                                                     

                                                                                                  

Historical:                                                                                       

- Allergies:                                                                                      

                                                                                             

15:00 No Known Drug Allergies;                                                                ph  

- PMHx:                                                                                           

15:00 Hypertension;                                                                           ph  

- PSHx:                                                                                           

15:00 Cholecystectomy; Appendectomy;                                                          ph  

                                                                                                  

- Immunization history:: Adult Immunizations unknown.                                             

                                                                                                  

                                                                                                  

ROS:                                                                                              

                                                                                             

07:49 Constitutional: The patient is a poor historian and mildly uncooperative                kdr 

                                                                                                  

Exam:                                                                                             

07:49 Constitutional:  This is a well developed, well nourished patient who is awake, alert,  kdr 

      and in no acute distress. Head/Face:  Normocephalic, atraumatic. Eyes:  Pupils equal        

      round and reactive to light, extra-ocular motions intact.  Lids and lashes normal.          

      Conjunctiva and sclera are non-icteric and not injected.  Cornea within normal limits.      

      Periorbital areas with no swelling, redness, or edema. Neck:  Trachea midline, no           

      thyromegaly or masses palpated, and no cervical lymphadenopathy.  Supple, full range of     

      motion without nuchal rigidity, or vertebral point tenderness.  No Meningismus.             

      Chest/axilla:  Normal chest wall appearance and motion.  Nontender with no deformity.       

      No lesions are appreciated. Cardiovascular:  Regular rate and rhythm with a normal S1       

      and S2.  No gallops, murmurs, or rubs.  Normal PMI, no JVD.  No pulse deficits.             

      Respiratory:  Lungs have equal breath sounds bilaterally, clear to auscultation and         

      percussion.  No rales, rhonchi or wheezes noted.  No increased work of breathing, no        

      retractions or nasal flaring. Abdomen/GI:  Soft, non-tender, with normal bowel sounds.      

      No distension or tympany.  No guarding or rebound.  No evidence of tenderness               

      throughout. Back:  No spinal tenderness.  No costovertebral tenderness.  Full range of      

      motion. Skin:  Warm, dry with normal turgor.  Normal color with no rashes, no lesions,      

      and no evidence of cellulitis. MS/ Extremity:  Pulses equal, no cyanosis.                   

      Neurovascular intact.  Full, normal range of motion. Psych:  Awake, alert, with             

      orientation to person, place and time.  Behavior, mood, and affect are within normal        

      limits.                                                                                     

07:49 Neuro: Orientation: appropriate for stated age, Mentation: lucid, inappropriate for         

      stated age, slow to respond, confused, Memory: appropriate for stated age, no acute         

      changes, Cranial nerves: is grossly normal based on the patient's age, no acute             

      changes, Cerebellar function: is grossly normal based on the patient's age, Motor: is       

      normal.                                                                                     

                                                                                                  

Vital Signs:                                                                                      

                                                                                             

14:52  / 58; Pulse 77; Resp 18; Temp 98.3; Pulse Ox 97% on R/A; Weight 104.33 kg;       ph  

16:14  / 56; Pulse 72; Resp 18; Pulse Ox 93% on R/A;                                    ph  

16:14 pt asleep                                                                               ph  

                                                                                                  

MDM:                                                                                              

16:40 Patient medically screened.                                                             kdr 

                                                                                             

07:49 Data reviewed: vital signs, nurses notes, lab test result(s), radiologic studies.       kdr 

      Counseling: I had a detailed discussion with the patient and/or guardian regarding: the     

      historical points, exam findings, and any diagnostic results supporting the                 

      discharge/admit diagnosis, lab results, radiology results, the need for outpatient          

      follow up. ED course: The patient eloped from the department prior to any sober adult       

      present to escort him home [facility] was notified per Trace.                                     

                                                                                                  

                                                                                             

14:53 Order name: Basic Metabolic Panel                                                       kdr 

                                                                                             

14:53 Order name: CBC with Diff                                                               kdr 

                                                                                             

14:53 Order name: LFT's; Complete Time: 16:31                                                 [facility] 

                                                                                             

14:53 Order name: Magnesium; Complete Time: 16:31                                             [facility] 

                                                                                             

14:53 Order name: NT PRO-BNP; Complete Time: 16:31                                            [facility] 

                                                                                             

14:53 Order name: PT-INR; Complete Time: 16:31                                                [facility] 

                                                                                             

14:53 Order name: Troponin (emerg Dept Use Only); Complete Time: 16:31                        [facility] 

                                                                                             

14:53 Order name: XRAY Chest (1 view); Complete Time: 16:31                                   [facility] 

                                                                                             

14:53 Order name: EKG; Complete Time: 14:54                                                   [facility] 

                                                                                             

14:53 Order name: Basic Metabolic Panel; Complete Time: 16:31                                 [facility]

                                                                                             

14:53 Order name: CBC with Automated Diff; Complete Time: 16:31                               [facility]

                                                                                             

15:23 Order name: ETOH Level; Complete Time: 16:31                                            [facility] 

                                                                                             

15:24 Order name: CT Head Brain wo Cont; Complete Time: 16:31                                 [facility] 

                                                                                             

14:53 Order name: Cardiac monitoring; Complete Time: 15:35                                    [facility] 

                                                                                             

14:53 Order name: EKG - Nurse/Tech; Complete Time: 15:52                                      [facility] 

                                                                                             

14:53 Order name: IV Saline Lock; Complete Time: 15:35                                        [facility] 

                                                                                             

14:53 Order name: Labs collected and sent; Complete Time: 15:35                               [facility] 

                                                                                             

14:53 Order name: O2 Per Protocol; Complete Time: 15:35                                       [facility] 

                                                                                             

14:53 Order name: O2 Sat Monitoring; Complete Time: 15:35                                     [facility] 

                                                                                                  

Administered Medications:                                                                         

No medications were administered                                                                  

                                                                                                  

                                                                                                  

Disposition:                                                                                      

20 16:40 Discharged to Home. Impression: Alcohol intoxication, Altered mental status,       

  unspecified.                                                                                    

- Condition is Stable.                                                                            

- Discharge Instructions: Confusion, Alcohol Intoxication, Easy-to-Read.                          

                                                                                                  

- Medication Reconciliation Form, Thank You Letter form.                                          

- Follow up: Private Physician; When: 2 - 3 days; Reason: If symptoms return, Further             

  diagnostic work-up, Recheck today's complaints, Continuance of care, Re-evaluation by           

  your physician.                                                                                 

- Problem is new.                                                                                 

- Symptoms have improved.                                                                         

                                                                                                  

                                                                                                  

                                                                                                  

Signatures:                                                                                       

Dispatcher [employer]                           [facility]                                                 

Jerardo Pearce MD MD   kdr                                                  

Trace Perez, DANIEL                        RN   em                                                   

Maria M Potter RN                      RN   ph                                                   

                                                                                                  

Corrections: (The following items were deleted from the chart)                                    

                                                                                             

16:40 16:40 2020 16:40 Discharged to Home. Impression: Alcohol intoxication. Condition  kdr 

      is Stable. Forms are Medication Reconciliation Form, Thank You Letter, Antibiotic           

      Education, Prescription Opioid Use. Follow up: Private Physician; When: 2 - 3 days;         

      Reason: If symptoms return, Further diagnostic work-up, Recheck today's complaints,         

      Continuance of care, Re-evaluation by your physician. Problem is new. Symptoms have         

      improved. kdr                                                                               

17:38 16:40 2020 16:40 Discharged to Home. Impression: Alcohol intoxication; Altered    em  

      mental status, unspecified. Condition is Stable. Forms are Medication Reconciliation        

      Form, Thank You Letter, Antibiotic Education, Prescription Opioid Use. Follow up:           

      Private Physician; When: 2 - 3 days; Reason: If symptoms return, Further diagnostic         

      work-up, Recheck today's complaints, Continuance of care, Re-evaluation by your             

      physician. Problem is new. Symptoms have improved. kdr                                      

                                                                                                  

**************************************************************************************************

## 2020-06-20 NOTE — EKG
Test Date:    2020-06-19               Test Time:    15:48:17

Technician:   LETY                                     

                                                     

MEASUREMENT RESULTS:                                       

Intervals:                                           

Rate:         78                                     

CA:           164                                    

QRSD:         100                                    

QT:           382                                    

QTc:          435                                    

Axis:                                                

P:            68                                     

CA:           164                                    

QRS:          73                                     

T:            74                                     

                                                     

INTERPRETIVE STATEMENTS:                                       

                                                     

Normal sinus rhythm

Normal ECG

Compared to ECG 10/02/2019 08:52:10

Left ventricular hypertrophy no longer present



Electronically Signed On 06-20-20 05:53:43 CDT by Juan Carlos Bustillos

## 2024-01-08 NOTE — RAD REPORT
EXAM DESCRIPTION:  RAD - Chest Single View - 6/19/2020 3:16 pm

 

CLINICAL HISTORY:  AMS

Chest pain.

 

COMPARISON:  Chest Single View dated 10/2/2019; Chest Single View dated 8/4/2018; Chest Single View d
ated 5/8/2018; CHEST SINGLE VIEW dated 4/17/2015

 

FINDINGS:  Portable technique limits examination quality.

 

The lungs are grossly clear. The heart is upper limit of normal in size. No displaced fractures.

 

IMPRESSION:  No acute intrathoracic process suspected. Medication: Allopurinol  Last office visit date: 9/28/23  Next appointment scheduled?: No;  due for 4 month follow up 1/21/24. Patient inpatient currently. Will leave reminder on script to call for appointment when discharged. Number of refills given: 180 R 0    Medication: Atorvastatin  Last office visit date: 9/28/23  Next appointment scheduled?: No; due for 4 month follow up 1/21/24. Patient inpatient currently. Will leave reminder on script to call for appointment when discharged.      Number of refills given: 90 R 0